# Patient Record
Sex: FEMALE | Race: WHITE | NOT HISPANIC OR LATINO | URBAN - METROPOLITAN AREA
[De-identification: names, ages, dates, MRNs, and addresses within clinical notes are randomized per-mention and may not be internally consistent; named-entity substitution may affect disease eponyms.]

---

## 2017-11-27 ENCOUNTER — OFFICE VISIT - HEALTHEAST (OUTPATIENT)
Dept: INTERNAL MEDICINE | Facility: CLINIC | Age: 32
End: 2017-11-27

## 2017-11-27 DIAGNOSIS — Z00.00 ANNUAL PHYSICAL EXAM: ICD-10-CM

## 2017-11-27 LAB
CHOLEST SERPL-MCNC: 119 MG/DL
FASTING STATUS PATIENT QL REPORTED: YES
HDLC SERPL-MCNC: 44 MG/DL
LDLC SERPL CALC-MCNC: 66 MG/DL
TRIGL SERPL-MCNC: 46 MG/DL

## 2017-11-27 ASSESSMENT — MIFFLIN-ST. JEOR: SCORE: 1836.69

## 2017-12-07 ENCOUNTER — OFFICE VISIT - HEALTHEAST (OUTPATIENT)
Dept: INTERNAL MEDICINE | Facility: CLINIC | Age: 32
End: 2017-12-07

## 2017-12-07 DIAGNOSIS — Z12.4 ENCOUNTER FOR PAPANICOLAOU SMEAR FOR CERVICAL CANCER SCREENING: ICD-10-CM

## 2017-12-07 ASSESSMENT — MIFFLIN-ST. JEOR: SCORE: 1864.81

## 2017-12-12 LAB
HPV INTERPRETATION - HISTORICAL: NORMAL
HPV INTERPRETER - HISTORICAL: NORMAL

## 2017-12-13 LAB
BKR LAB AP ABNORMAL BLEEDING: NO
BKR LAB AP BIRTH CONTROL/HORMONES: NORMAL
BKR LAB AP CERVICAL APPEARANCE: NORMAL
BKR LAB AP GYN ADEQUACY: NORMAL
BKR LAB AP GYN INTERPRETATION: NORMAL
BKR LAB AP HPV REFLEX: NORMAL
BKR LAB AP LMP: NORMAL
BKR LAB AP PATIENT STATUS: NORMAL
BKR LAB AP PREVIOUS ABNORMAL: NO
BKR LAB AP PREVIOUS NORMAL: NORMAL
HIGH RISK?: NO
PATH REPORT.COMMENTS IMP SPEC: NORMAL
RESULT FLAG (HE HISTORICAL CONVERSION): NORMAL

## 2018-05-10 ENCOUNTER — OFFICE VISIT - HEALTHEAST (OUTPATIENT)
Dept: INTERNAL MEDICINE | Facility: CLINIC | Age: 33
End: 2018-05-10

## 2018-05-10 ENCOUNTER — COMMUNICATION - HEALTHEAST (OUTPATIENT)
Dept: TELEHEALTH | Facility: CLINIC | Age: 33
End: 2018-05-10

## 2018-05-10 DIAGNOSIS — R10.2 SUPRAPUBIC DISCOMFORT: ICD-10-CM

## 2018-05-10 DIAGNOSIS — R11.0 NAUSEA: ICD-10-CM

## 2018-05-10 LAB
ALBUMIN UR-MCNC: ABNORMAL MG/DL
APPEARANCE UR: ABNORMAL
BACTERIA #/AREA URNS HPF: ABNORMAL HPF
BILIRUB UR QL STRIP: ABNORMAL
COLOR UR AUTO: YELLOW
GLUCOSE UR STRIP-MCNC: NEGATIVE MG/DL
HCG UR QL: NEGATIVE
HGB UR QL STRIP: ABNORMAL
KETONES UR STRIP-MCNC: ABNORMAL MG/DL
LEUKOCYTE ESTERASE UR QL STRIP: ABNORMAL
MUCOUS THREADS #/AREA URNS LPF: ABNORMAL LPF
NITRATE UR QL: NEGATIVE
PH UR STRIP: 5.5 [PH] (ref 5–8)
RBC #/AREA URNS AUTO: ABNORMAL HPF
SP GR UR STRIP: 1.02 (ref 1–1.03)
SP GR UR STRIP: 1.02 (ref 1–1.03)
SQUAMOUS #/AREA URNS AUTO: ABNORMAL LPF
TRANS CELLS #/AREA URNS HPF: ABNORMAL LPF
UROBILINOGEN UR STRIP-ACNC: ABNORMAL
WBC #/AREA URNS AUTO: ABNORMAL HPF

## 2018-05-11 LAB — BACTERIA SPEC CULT: NO GROWTH

## 2018-11-29 ENCOUNTER — OFFICE VISIT - HEALTHEAST (OUTPATIENT)
Dept: FAMILY MEDICINE | Facility: CLINIC | Age: 33
End: 2018-11-29

## 2018-11-29 DIAGNOSIS — Z00.00 ROUTINE GENERAL MEDICAL EXAMINATION AT A HEALTH CARE FACILITY: ICD-10-CM

## 2018-11-29 DIAGNOSIS — Z12.31 SCREENING MAMMOGRAM, ENCOUNTER FOR: ICD-10-CM

## 2018-11-29 DIAGNOSIS — R21 RASH: ICD-10-CM

## 2018-11-29 DIAGNOSIS — H60.63 CHRONIC OTITIS EXTERNA OF BOTH EARS, UNSPECIFIED TYPE: ICD-10-CM

## 2018-11-29 DIAGNOSIS — N60.11 FIBROCYSTIC BREAST CHANGES, BILATERAL: ICD-10-CM

## 2018-11-29 DIAGNOSIS — N60.12 FIBROCYSTIC BREAST CHANGES, BILATERAL: ICD-10-CM

## 2018-11-29 LAB
CHOLEST SERPL-MCNC: 106 MG/DL
FASTING STATUS PATIENT QL REPORTED: YES
FASTING STATUS PATIENT QL REPORTED: YES
GLUCOSE BLD-MCNC: 99 MG/DL (ref 70–99)
HBA1C MFR BLD: 5 % (ref 3.5–6)
HDLC SERPL-MCNC: 60 MG/DL
LDLC SERPL CALC-MCNC: 42 MG/DL
TRIGL SERPL-MCNC: 22 MG/DL

## 2018-11-29 ASSESSMENT — MIFFLIN-ST. JEOR: SCORE: 1802.44

## 2019-01-02 ENCOUNTER — HOSPITAL ENCOUNTER (OUTPATIENT)
Dept: MAMMOGRAPHY | Facility: CLINIC | Age: 34
Discharge: HOME | End: 2019-01-02
Attending: FAMILY MEDICINE

## 2019-01-02 DIAGNOSIS — Z12.31 SCREENING MAMMOGRAM, ENCOUNTER FOR: ICD-10-CM

## 2019-01-02 DIAGNOSIS — N60.12 FIBROCYSTIC BREAST CHANGES, BILATERAL: ICD-10-CM

## 2019-01-02 DIAGNOSIS — N60.11 FIBROCYSTIC BREAST CHANGES, BILATERAL: ICD-10-CM

## 2019-08-02 ENCOUNTER — OFFICE VISIT - HEALTHEAST (OUTPATIENT)
Dept: FAMILY MEDICINE | Facility: CLINIC | Age: 34
End: 2019-08-02

## 2019-08-02 DIAGNOSIS — F32.1 MODERATE MAJOR DEPRESSION (H): ICD-10-CM

## 2019-08-02 DIAGNOSIS — Z00.00 ROUTINE GENERAL MEDICAL EXAMINATION AT A HEALTH CARE FACILITY: ICD-10-CM

## 2019-08-02 LAB
CHOLEST SERPL-MCNC: 111 MG/DL
FASTING STATUS PATIENT QL REPORTED: ABNORMAL
FASTING STATUS PATIENT QL REPORTED: NORMAL
GLUCOSE BLD-MCNC: 90 MG/DL (ref 70–125)
HDLC SERPL-MCNC: 48 MG/DL
LDLC SERPL CALC-MCNC: 57 MG/DL
TRIGL SERPL-MCNC: 32 MG/DL

## 2019-08-02 ASSESSMENT — MIFFLIN-ST. JEOR: SCORE: 1864.02

## 2019-08-02 NOTE — ASSESSMENT & PLAN NOTE
Discussed treatment options. She would like to try medications. Start sertraline 25 mg daily for 7 days, then 50 mg daily. Lorazepam as needed at bedtime for short term use only.  was checked and was clear. Recheck in 3 months. Psychology recommended and patient is agreeable. Referral placed.

## 2019-08-04 LAB — HBA1C MFR BLD: 5.4 % (ref 4.2–6.1)

## 2019-09-10 ENCOUNTER — OFFICE VISIT - HEALTHEAST (OUTPATIENT)
Dept: BEHAVIORAL HEALTH | Facility: HOSPITAL | Age: 34
End: 2019-09-10

## 2019-09-10 DIAGNOSIS — F33.0 MAJOR DEPRESSIVE DISORDER, RECURRENT EPISODE, MILD (H): ICD-10-CM

## 2019-09-13 ASSESSMENT — ANXIETY QUESTIONNAIRES
7. FEELING AFRAID AS IF SOMETHING AWFUL MIGHT HAPPEN: SEVERAL DAYS
4. TROUBLE RELAXING: SEVERAL DAYS
3. WORRYING TOO MUCH ABOUT DIFFERENT THINGS: SEVERAL DAYS
IF YOU CHECKED OFF ANY PROBLEMS ON THIS QUESTIONNAIRE, HOW DIFFICULT HAVE THESE PROBLEMS MADE IT FOR YOU TO DO YOUR WORK, TAKE CARE OF THINGS AT HOME, OR GET ALONG WITH OTHER PEOPLE: SOMEWHAT DIFFICULT
2. NOT BEING ABLE TO STOP OR CONTROL WORRYING: SEVERAL DAYS
5. BEING SO RESTLESS THAT IT IS HARD TO SIT STILL: SEVERAL DAYS
6. BECOMING EASILY ANNOYED OR IRRITABLE: MORE THAN HALF THE DAYS
GAD7 TOTAL SCORE: 9
1. FEELING NERVOUS, ANXIOUS, OR ON EDGE: MORE THAN HALF THE DAYS

## 2019-09-13 ASSESSMENT — PATIENT HEALTH QUESTIONNAIRE - PHQ9: SUM OF ALL RESPONSES TO PHQ QUESTIONS 1-9: 6

## 2019-10-01 ENCOUNTER — OFFICE VISIT - HEALTHEAST (OUTPATIENT)
Dept: BEHAVIORAL HEALTH | Facility: HOSPITAL | Age: 34
End: 2019-10-01

## 2019-10-01 DIAGNOSIS — F33.0 MAJOR DEPRESSIVE DISORDER, RECURRENT EPISODE, MILD (H): ICD-10-CM

## 2019-10-15 ENCOUNTER — OFFICE VISIT - HEALTHEAST (OUTPATIENT)
Dept: BEHAVIORAL HEALTH | Facility: HOSPITAL | Age: 34
End: 2019-10-15

## 2019-10-15 DIAGNOSIS — F33.0 MAJOR DEPRESSIVE DISORDER, RECURRENT EPISODE, MILD (H): ICD-10-CM

## 2019-10-16 ENCOUNTER — AMBULATORY - HEALTHEAST (OUTPATIENT)
Dept: BEHAVIORAL HEALTH | Facility: CLINIC | Age: 34
End: 2019-10-16

## 2019-10-29 ENCOUNTER — OFFICE VISIT - HEALTHEAST (OUTPATIENT)
Dept: BEHAVIORAL HEALTH | Facility: HOSPITAL | Age: 34
End: 2019-10-29

## 2019-10-29 DIAGNOSIS — F33.0 MAJOR DEPRESSIVE DISORDER, RECURRENT EPISODE, MILD (H): ICD-10-CM

## 2019-11-12 ENCOUNTER — OFFICE VISIT - HEALTHEAST (OUTPATIENT)
Dept: BEHAVIORAL HEALTH | Facility: HOSPITAL | Age: 34
End: 2019-11-12

## 2019-11-12 ENCOUNTER — AMBULATORY - HEALTHEAST (OUTPATIENT)
Dept: BEHAVIORAL HEALTH | Facility: CLINIC | Age: 34
End: 2019-11-12

## 2019-11-12 DIAGNOSIS — F33.0 MAJOR DEPRESSIVE DISORDER, RECURRENT EPISODE, MILD (H): ICD-10-CM

## 2019-12-11 ENCOUNTER — OFFICE VISIT - HEALTHEAST (OUTPATIENT)
Dept: BEHAVIORAL HEALTH | Facility: HOSPITAL | Age: 34
End: 2019-12-11

## 2019-12-11 DIAGNOSIS — F33.0 MILD EPISODE OF RECURRENT MAJOR DEPRESSIVE DISORDER (H): ICD-10-CM

## 2019-12-18 ENCOUNTER — OFFICE VISIT - HEALTHEAST (OUTPATIENT)
Dept: BEHAVIORAL HEALTH | Facility: HOSPITAL | Age: 34
End: 2019-12-18

## 2019-12-18 DIAGNOSIS — F33.0 MILD EPISODE OF RECURRENT MAJOR DEPRESSIVE DISORDER (H): ICD-10-CM

## 2020-01-08 ENCOUNTER — OFFICE VISIT - HEALTHEAST (OUTPATIENT)
Dept: BEHAVIORAL HEALTH | Facility: HOSPITAL | Age: 35
End: 2020-01-08

## 2020-01-08 DIAGNOSIS — F33.0 MAJOR DEPRESSIVE DISORDER, RECURRENT EPISODE, MILD (H): ICD-10-CM

## 2020-01-22 ENCOUNTER — OFFICE VISIT - HEALTHEAST (OUTPATIENT)
Dept: BEHAVIORAL HEALTH | Facility: HOSPITAL | Age: 35
End: 2020-01-22

## 2020-01-22 DIAGNOSIS — F33.0 MAJOR DEPRESSIVE DISORDER, RECURRENT EPISODE, MILD (H): ICD-10-CM

## 2020-02-05 ENCOUNTER — OFFICE VISIT - HEALTHEAST (OUTPATIENT)
Dept: BEHAVIORAL HEALTH | Facility: HOSPITAL | Age: 35
End: 2020-02-05

## 2020-02-05 DIAGNOSIS — F33.0 MAJOR DEPRESSIVE DISORDER, RECURRENT EPISODE, MILD (H): ICD-10-CM

## 2020-02-19 ENCOUNTER — OFFICE VISIT - HEALTHEAST (OUTPATIENT)
Dept: BEHAVIORAL HEALTH | Facility: HOSPITAL | Age: 35
End: 2020-02-19

## 2020-02-19 DIAGNOSIS — F33.0 MAJOR DEPRESSIVE DISORDER, RECURRENT EPISODE, MILD (H): ICD-10-CM

## 2020-03-04 ENCOUNTER — OFFICE VISIT - HEALTHEAST (OUTPATIENT)
Dept: BEHAVIORAL HEALTH | Facility: HOSPITAL | Age: 35
End: 2020-03-04

## 2020-03-04 DIAGNOSIS — F33.0 MAJOR DEPRESSIVE DISORDER, RECURRENT EPISODE, MILD (H): ICD-10-CM

## 2020-03-17 ENCOUNTER — COMMUNICATION - HEALTHEAST (OUTPATIENT)
Dept: BEHAVIORAL HEALTH | Facility: CLINIC | Age: 35
End: 2020-03-17

## 2020-07-08 ENCOUNTER — AMBULATORY - HEALTHEAST (OUTPATIENT)
Dept: BEHAVIORAL HEALTH | Facility: CLINIC | Age: 35
End: 2020-07-08

## 2020-07-24 ENCOUNTER — COMMUNICATION - HEALTHEAST (OUTPATIENT)
Dept: FAMILY MEDICINE | Facility: CLINIC | Age: 35
End: 2020-07-24

## 2020-07-24 DIAGNOSIS — F32.1 MODERATE MAJOR DEPRESSION (H): ICD-10-CM

## 2020-09-01 ENCOUNTER — COMMUNICATION - HEALTHEAST (OUTPATIENT)
Dept: FAMILY MEDICINE | Facility: CLINIC | Age: 35
End: 2020-09-01

## 2020-09-01 DIAGNOSIS — F32.1 MODERATE MAJOR DEPRESSION (H): ICD-10-CM

## 2020-09-03 ENCOUNTER — COMMUNICATION - HEALTHEAST (OUTPATIENT)
Dept: FAMILY MEDICINE | Facility: CLINIC | Age: 35
End: 2020-09-03

## 2020-09-04 ENCOUNTER — OFFICE VISIT - HEALTHEAST (OUTPATIENT)
Dept: FAMILY MEDICINE | Facility: CLINIC | Age: 35
End: 2020-09-04

## 2020-09-04 DIAGNOSIS — F32.1 MODERATE MAJOR DEPRESSION (H): ICD-10-CM

## 2020-09-04 ASSESSMENT — PATIENT HEALTH QUESTIONNAIRE - PHQ9: SUM OF ALL RESPONSES TO PHQ QUESTIONS 1-9: 7

## 2020-10-07 ENCOUNTER — COMMUNICATION - HEALTHEAST (OUTPATIENT)
Dept: FAMILY MEDICINE | Facility: CLINIC | Age: 35
End: 2020-10-07

## 2020-10-07 DIAGNOSIS — F32.1 MODERATE MAJOR DEPRESSION (H): ICD-10-CM

## 2021-05-26 ASSESSMENT — PATIENT HEALTH QUESTIONNAIRE - PHQ9: SUM OF ALL RESPONSES TO PHQ QUESTIONS 1-9: 6

## 2021-05-27 ASSESSMENT — PATIENT HEALTH QUESTIONNAIRE - PHQ9: SUM OF ALL RESPONSES TO PHQ QUESTIONS 1-9: 7

## 2021-05-28 ASSESSMENT — ANXIETY QUESTIONNAIRES: GAD7 TOTAL SCORE: 9

## 2021-05-31 VITALS — HEIGHT: 70 IN | BODY MASS INDEX: 33.18 KG/M2 | WEIGHT: 231.8 LBS

## 2021-05-31 VITALS — WEIGHT: 238 LBS | BODY MASS INDEX: 34.07 KG/M2 | HEIGHT: 70 IN

## 2021-05-31 NOTE — PROGRESS NOTES
Assessment/Plan:      Problem List Items Addressed This Visit     Moderate major depression (H)     Discussed treatment options. She would like to try medications. Start sertraline 25 mg daily for 7 days, then 50 mg daily. Lorazepam as needed at bedtime for short term use only.  was checked and was clear. Recheck in 3 months. Psychology recommended and patient is agreeable. Referral placed.         Relevant Medications    LORazepam (ATIVAN) 0.5 MG tablet    sertraline (ZOLOFT) 50 MG tablet    Other Relevant Orders    Ambulatory referral to Psychology      Other Visit Diagnoses     Routine general medical examination at a health care facility    -  Primary    Relevant Orders    Lipid [place] FASTING (Completed)    Glycosylated Hemoglobin A1c (Completed)    Glucose (Completed)          Patient Instructions   1. Start sertraline 25 mg daily for 7 days, then 50 mg daily.  2. We will notify you of lab results.  3. Recheck in 3 months.  4. Lorazepam at bedtime as needed.        No follow-ups on file.    Subjective:   Hermelinda Morales is a 34 y.o. female who presents today for a physical. She needs fasting labs done for a wellness physical for work/insurance.    The patient is also having some issues with depression. She has a history of depression starting in college. She did see a counselor in college and that was very helpful. She is having decreased energy and motivation. No plans to hurt herself. She drinks alcohol about 1-2 per week. No substance use. No family history of mood disorder but her family may not share this issue.      Patient Active Problem List   Diagnosis     BMI 34.0-34.9,adult     Dense breast tissue on mammogram     Moderate major depression (H)      History reviewed. No pertinent past medical history.  History reviewed. No pertinent surgical history.    Review of Systems   Constitutional: Negative for activity change, appetite change, fever and unexpected weight change.   HENT: Negative for  "congestion, hearing loss and sore throat.    Eyes: Negative for discharge and visual disturbance.   Respiratory: Negative for cough, shortness of breath and wheezing.    Cardiovascular: Negative for chest pain, palpitations and leg swelling.   Gastrointestinal: Negative for abdominal pain, blood in stool, constipation, diarrhea and vomiting.   Endocrine: Negative for polydipsia and polyuria.   Genitourinary: Negative for decreased urine volume, difficulty urinating, dysuria, frequency, hematuria and urgency.   Musculoskeletal: Negative for arthralgias, gait problem and myalgias.   Skin: Negative for rash.   Allergic/Immunologic: Negative for immunocompromised state.   Neurological: Negative for weakness.   Hematological: Does not bruise/bleed easily.         Objective:     Vitals:    08/02/19 1559 08/02/19 1604   BP: 118/90 120/90   Pulse: 72    Weight: (!) 235 lb 3.2 oz (106.7 kg)    Height: 5' 10.75\" (1.797 m)    LMP: 07/02/2019       Physical Exam   Constitutional: She is oriented to person, place, and time. She appears well-nourished. No distress.   HENT:   Right Ear: Tympanic membrane and ear canal normal.   Left Ear: Tympanic membrane and ear canal normal.   Mouth/Throat: Oropharynx is clear and moist.   Eyes: Pupils are equal, round, and reactive to light. Conjunctivae and EOM are normal.   Neck: Normal range of motion. Neck supple. Carotid bruit is not present. No thyromegaly present.   Cardiovascular: Normal rate, regular rhythm and normal heart sounds.   No murmur heard.  Pulmonary/Chest: Effort normal and breath sounds normal. She has no wheezes. She has no rales. Right breast exhibits no inverted nipple, no mass and no skin change. Left breast exhibits no inverted nipple, no mass and no skin change.   Abdominal: Soft. Bowel sounds are normal. She exhibits no distension and no mass. There is no hepatosplenomegaly. There is no tenderness. There is no rebound and no guarding. Hernia confirmed negative in " the ventral area.   Musculoskeletal: She exhibits no edema or deformity.   Lymphadenopathy:     She has no cervical adenopathy.   Neurological: She is alert and oriented to person, place, and time. She has normal strength. She displays no tremor. No cranial nerve deficit. She exhibits normal muscle tone. Gait normal.   Reflex Scores:       Patellar reflexes are 2+ on the right side and 2+ on the left side.  Skin: No rash noted.   Psychiatric: Her speech is normal. Judgment and thought content normal. Her affect is not labile. Cognition and memory are normal.   Patient is tearful. Good eye contact. Answers questions appropriately. Affect is blunted.  She is attentive.

## 2021-05-31 NOTE — PATIENT INSTRUCTIONS - HE
1. Start sertraline 25 mg daily for 7 days, then 50 mg daily.  2. We will notify you of lab results.  3. Recheck in 3 months.  4. Lorazepam at bedtime as needed.

## 2021-06-01 VITALS — BODY MASS INDEX: 31.58 KG/M2 | WEIGHT: 220.1 LBS

## 2021-06-01 NOTE — PROGRESS NOTES
Initial Psychotherapy Diagnostic Assessment     [x] Standard  [] Updated    Date(s): 9/10/2019  Start Time: 354  Stop Time: 454    Patient Name:  Hermelinda Morales  Age: 34 y.o.   :  1985  MRN:  [id number]    Session Type: Patient is presenting for an Individual session.       Reason for Referral:  Mrs. Morales is a 34 y.o. year-old female who was referred on 2019 by Dr. Singh for an evaluation of cognitive, behavioral, and emotional functioning.  The patient was made aware of the role of psychology service in the patient's care, risks and benefits, and the limits of confidentiality.  The patient agreed to proceed.         Persons Present: Patient and therapist    Presenting Problem/History:  Patient reported coming to therapy due to an increase in her depression. Patient indicated she has struggled with depression throughout her whole life. Patient reported it started to get worse in college so she sought therapy which was beneficial and decreased her anxiety. Patient indicated then in February starting to notice her depression slowly get worse. Patient reported it went from affecting her monthly to weekly, to every couple of days to daily. Patient indicated there are days where she will cry and have no idea why she is crying. Patient reported knowing it is affecting her marriage due to not being able to explain her emotions. Patient indicated she hopes that people will cancel plans so that she has a reason to stay home. Patient reported she is able to complete her daily asks such as working. Patient indicated she also has a lot of concern about her weight and wanting to lose weight. Patient reported she knows she needs to start doing things but has no motivation.       Patient s expectation for treatment (patient stated initial goal; i.e.: I want to let go of my worries , Medication treatment if indicated):  Patient reported wanting to learn coping skills to help her better manage her depression  symptoms.          Functional Impairments: (subjective- 0 is no issues and 4 is extreme issues)  Personal: 3  Family: 2  Work: 2  Social:2     How does the presenting problem affect patients daily functioning:    Patient indicated she has noticed she is abhishek isolated and not wanting to do activities.     Issues/Stressors:   Patient reported her stressors includes her marriage, her weight and her job.     Physical Problems: Dry mouth and Decreased appitite    Social Problems: Communications problems and Loss of interest in activities      Behavioral Problems: Obsessive/Compulsive      Cognitive Problems: Indecisiveness and Worries      Emotional Problems: Anxious, Nervous, Depressed mood, Mood swings and Lack of self confidence     Onset/Frequency/Duration presenting problem symptoms:    Patient reported feeling she has had depression her whole life. Patient indicated recognizing it in high school and then her depression getting worse in college.     How does the patient perceive her problem in relation to how others see his/her problem?    Patient indicated her  and her are aware of her depression symptoms and that it is affecting them in a negative way.     Family/Social History:     Marriages/Significant other:      Patient reported she has been  for 7 1/2 years. Patient indicated the marriage is struggling right now due to her depression and her  traveling a lot for work. Patient reported they are seeking marriage counseling.     Children:  (sex and ages, any significant issues):  Patient does not have any children.     Parents:  (ages, living or , how many years ):  Patient indicated her parents have been  over 30 years.     Siblings:  (birth order, ages, significant issues):  Patient reported she has an older brother.     Education:   Patient reported receiving her bachelor's degree.     Developmental factors:  (developmental milestones, head injuries, CVA s, etc. that  may have impeded milestones):  Patient indicated having a speech impairment and doing speech therapy. Patient reported otherwise she met all developmental milestones and no head trauma's.     Significant personal relationships including patient s evaluation of the relationship quality:  (Co-worker s, neighbor s, AA groups, Restorationist peers, etc.):   Patient indicated her support network includes her , a good friend and her aunt.       Sexual/physical/emotional/financial abuse/traumatic event:  (any child protection involvement; who reported, Impact on patient/family/other):   Patient denied any physical, mental or emotional abuse.       Contextual Non-personal factors contributing to the patients concerns:  (divorce in family, nation/natural disasters):  Patient did not identify any contextual non-personal factors contributing to her presenting concerns.     Strengths/personal resources:  (what does the patient do well, what is going well in life, positive personality characteristics):  Patient indicated her strengths includes being creative and loyal.       Belief system:    Patient did not identify a belief system.     Cultural influences and impact on patient:  (ask about all aspects of culture and ask which are relevant to the patient. Go beyond nationality and ethnicity. Consider biases, life style, community style, i.e.: urban, poverty, abuse, etc). see page 5 Diagnostic Assessment, Clinical Training for descriptors):  Patient is a 34 year old   female. Patient indicated growing up going to a very small school. Patient reported in 2nd grade getting casted as the outcast of the school. Patient indicated remembering this being a very hard and lonely time in her life. Patient reported trying to talk to her parents but they did not understand. Patient indicated in college trying to talk to her mom about depression and her mom saying it does not exist. Patient reported she has never talked to her  family about depression again. Patient indicated her parents raised her to be hard working and not talk about problems.     Cultural impact on health and health care:    Patient reported using Western medicine by going to the doctor and taking medications as prescribed.     Current living situation:  (Household members, housing status, stability, multiple moves, potential eviction):  Patient indicated she lives in a house with her  that they own. Patient reported she feels safe.     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     Legal Problems:  (DUI S, divorce, law suits, etc.):    Patient denied any legal problems.     Patient Medical History  Ms. Grajeda did not identify any significant medical history.     Current Medications:  Current Outpatient Medications   Medication Sig     ibuprofen (ADVIL,MOTRIN) 200 MG tablet Take 200 mg by mouth every 6 (six) hours as needed for pain.     LORazepam (ATIVAN) 0.5 MG tablet Take 1 tablet (0.5 mg total) by mouth at bedtime as needed for anxiety.     neomycin-polymyxin-hydrocortisone (CORTISPORIN) otic solution Administer 3 drops into the left ear 3 (three) times a day as needed.     sertraline (ZOLOFT) 50 MG tablet Take 1 tablet (50 mg total) by mouth daily.       Past Mental Health History:    Previous mental health diagnosis:  Patient indicated being diagnosed with depression.     Hx of Mental Health Treatment or Services:  Patient reported in college seeing a counselor for her depression.     Hx of MH Tx/Hospitalizations:  (When/Where: must include a review of patient s record.  If not available, why, what if anything are you doing to obtain a record?):    Patient denied any mental health hospitalizations.     Hx of Psychiatric Medications:  Patient reported being put on Zoloft by Dr. Singh      Self Report Measures:    On the Patient Health Questionnaire-9, a self report measure of depressive symptomatology, she obtained a score of 6, placing her in the range of mild depression.      On the Generalized Anxiety Disorder-7, a self-report measure of anxiety, she obtained a score of 9,  placing her in the range of moderate anxiety.      Suicidal/Homicidal Risk Assessment:    Suicidal: None reported  Ideation:None reported  History of Past Attempt(s): description: None reported  Crisis Plan: Not needed at this time    Homicidal: None reported   Ideation:None reported  History of Aggression towards others: None reported  Crisis Plan: Not needed at this time    [place] Suicide Severity Risk Screen:  Negative    History of destruction to property:  Description: None reported  Crisis Plan: Not needed at this time      Family Mental Health/Medical History    Family Mental Health:    Patient denied any known mental health in her family.     Family history of Suicide:  Patient denied any history of family suicides.     Family history Chemical Dependency:    Patient reported her father use to struggle with alcohol use.     Family Medical history: Family medical history is significant for:   Family History   Problem Relation Age of Onset     No Medical Problems Mother      No Medical Problems Father      No Medical Problems Brother      Hypertension Paternal Grandfather      Diabetes Maternal Grandmother      Skin cancer Maternal Grandmother      ABDIAZIZ disease Other      Breast cancer Neg Hx      Colon cancer Neg Hx      Prostate cancer Neg Hx      Chemical Use/Abuse History    CAGE-AID (screening to determine a patients use/abuse/dependency):      0/4    1.  Have you ever felt you ought to cut down on your drinking or drug use?  2.  Have people annoyed you by criticizing your  drinking or drug use?  3.  Have you felt bad or guilty about your drinking or drug use?  4.  Have you ever had a drink or use drugs first thing in the morning to steady her nerves are to get rid of a hangover (eye-opener)?    Alcohol:   [] None Reported    [x] Yes   [] No     Frequency: Occasionally  Age of first use: 22    Date of last use: Two weeks ago          Street Drugs:   [x] None Reported    [] Yes   [] No      Prescription Drugs:   [x] None Reported    [] Yes   [] No    Tobacco:   [x] None Reported    [] Yes   [] No    Caffeine:   [] None Reported    [x] Yes   [] No    Currently in a treatment program:   [] Yes   [x] No    History of CD Treatment:      [x] None Reported               MENTAL STATUS EVALUATION  Grooming: Well groomed  Attire: Appropriate  Age: Appears Stated  Behavior Towards Examiner: Cooperative  Motor Activity: Within normal   Eye Contact: Appropriate  Mood: Depressed  Affect: Depressed  Speech/Language: Within normal  Attention: Within normal  Concentration: Within normal  Thought Process: Within normal  Thought Content: Hallucinations: None reported  Delusions: None reported  Orientation: X 3  Memory: No Evidence of Impairment  Judgement: No Evidence of Impairment  Estimated Intelligence: Average  Demonstrated Insight: Adequate  Fund of Knowledge: adequate    Clinical Impressions/Assessment/Recommendations: (Stands alone; is a synopsis of patients story, any impacting family or cultural issue on diagnosis and how patient meets criteria for diagnosis). Can state does not meet full criteria and therefore a provisional diagnosis is given.    The patient is a 34 y.o. year-old,  female.  Presenting Problem/History:  Patient reported coming to therapy due to an increase in her depression. Patient indicated she has struggled with depression throughout her whole life. Patient reported it started to get worse in college so she sough therapy which was beneficial and decreased her anxiety.  Patient indicated then in February starting to notice her depression slowly get worse. Patient reported it went from affecting her monthly to weekly, to every couple of days to daily. Patient indicated there are days where she will cry and have no idea why she is crying. Patient reported knowing it is affecting her marriage due to not being able to explain her emotions. Patient indicated she hopes that people will cancel plans so that she has a reason to stay home. Patient reported she is able to complete her daily asks such as working. Patient indicated she also has a lot of concern about her weight and wanting to lose weight. Patient reported she knows she needs to start doing things but has no motivation. Patient reported wanting to learn coping skills to help her better manage her depression symptoms. Patient indicated she has noticed she is abhishek isolated and not wanting to do activities. Patient reported her stressors includes her marriage, her weight and her job. Patient reported feeling she has had depression her whole life. Patient indicated recognizing it in high school and then her depression getting worse in college. Patient indicated her  and her are aware of her depression symptoms and that it is affecting them in a negative way.     Patient indicated growing up going to a very small school. Patient reported in 2nd grade getting casted as the outcast of the school. Patient indicated remembering this being a very hard and lonely time in her life. Patient reported trying to talk to her parents but they did not understand. Patient indicated in college trying to talk to her mom about depression and her mom saying it does not exist. Patient reported she has never talked to her family about depression again. Patient indicated her parents raised her to be hard working and not talk about problems.        Prioritization of needed mental Health ancillary or other services.   It appears patient would benefit  from one on one psychotherapy bi-weekly to work on addressing her depression, working on skills to reduce her symptoms and challenge negative thoughts.   Patient should continue to work with Dr. Singh on medication management.   How Diagnostic criteria is met: (Include symptoms, frequency, duration, functional impairments).  It appears patient meets DSM-5 criteria for major depressive disorder, recurrent mild as evidenced by depressed mood, decreased interests in pleasurable activities, isolation, social withdrawal, increased appetite, excessive sleep at times, feelings of worthlessness, low self-esteem, frequent crying spell and irritable.       Explanation for any provisional diagnosis. Hypothesis why alternative diagnosis was considered and ruled out.  Rule out of generalized anxiety disorder due to patient reported symptoms of anxiety at times but does not meet criteria at this time.     Recommendations (treatment, referrals, services needed).  One on one bi-weekly psychotherapy  Continue medication management with Dr. Singh    Diagnosis (non-Axial as defined in DSM-5)  Major depressive disorder, recurrent, mild    Provisional Diagnosis (list only- no explanation needed)  DAYANA      WHODAS 2.0 12-item version 12.50%   H1= 20  H2= 0  H3= 10    Scores presented in qualifiers to represent level of disability.    NO problem - (none, absent, negligible,  ) - 0-4 %   MILD problem - (slight, low, ) - 5-24 %   MODERATE problem - (medium, fair,...) - 25-49 %   SEVERE problem - (high, extreme,  ) - 50-95 %   COMPLETE problem - (total, ) -  %    Assessment of client resolving presenting mental health concerns:  Ability  [] low     [x] average     [] high  Motivation [] low     [x] average     [] high  Willingness [] low     [x] average     [] high    Sources/references used in completing this assessment:   -Face to face interview  -Patient Chart  -Adult Intake Questionnaire  -Measures completed: WHODAS, C-SSRS, CAGE,  PHQ-9, DAYANA-7,     Initial Therapy Plan (ex: develop therapeutic relationship with therapist, Refer to psychiatry/psych testing, etc.):    1. Patient and therapist will develop therapeutic relationship.  2. Patient to present for follow up appointment to initiate psychotherapy services.  3. Patient to continue to follow up with primary care physician as needed and take medications as prescribed.  4. Develop comprehensive treatment plan.      Is patient's family involved in the treatment?  [] No     [x] Yes    If yes, How?  Patient's  is support of her seeking therapy.     Therapist s Signature/Supervision/co-signature statement:   Vera Marti [facility] Center[facility]LE

## 2021-06-01 NOTE — PROGRESS NOTES
Mental Health Visit Note    10/1/2019    Start time: 402    Stop Time: 502   Session # 1    53+ due to patient's mental health symptoms increase due to stress with marriage.     Session Type: Patient is presenting for an Individual session.    Hermelinda Morales is a 34 y.o. female is being seen today for    Chief Complaint   Patient presents with      Follow Up     Marriage Problems     New symptoms or complaints: Patient reported her marriage not going well.     Present For Session: Patient and therapist    Functional Impairment:   Personal: 3  Family: 2  Work: 2  Social:2    Clinical assessment of mental status: Ms. Morales presented on time for her scheduled session today. She was open and cooperative, and dressed appropriately for this session and weather. Her memory was fair for short and long term.  Her speech and language was soft, concise and appropriate for session.  Concentration and focus is fair.  Psychosis is not noted or reported. She reports her mood is sad and numb. Affect is congruent with speech.  Fund of knowledge is adequate. Insight is adequate for therapy.     Suicidal/Homicidal Ideation present: None Reported This Session    Patient's impression of their current status: Patient reported feeling numb. Patient indicated this last week finding out more and more information about her  talking to other women. Patient reported some of it being from the past and some from the present. Patient indicated she has confronted her  but unsure what she wants at this time. Patient reported her  is sorry but not even knowing how she feels right now. Patient indicated at times wanting to be comforted by her  and at other times needing distance. Patient reported she knows she is not taking care of herself and has no appetite at this time.     Therapist impression of patients current state: Patient appears to have fair insight into her mental health. This therapist challenged  patient on ways she is struggling with her own self-care at this time. This therapist processed with patient the importance of self-care at this time. This therapist processed with patient the importance of talking about her emotions by journal or if comfortable with her support network. This therapist went over coping skills that can be beneficial to help reduce racing thoughts.     Type of psychotherapeutic technique provided: Insight oriented, Client centered and CBT    Progress toward short term goals:Progress as expected, patient returning to scheduled session and noticing mental health symptoms.     Review of long term goals: Not done at today's visit    Diagnosis:   1. Major depressive disorder, recurrent episode, mild (H)        Plan and Follow up: Patient will return in two weeks for scheduled session. Patient would benefit from working on self-care. Patient should use coping skills taught in session to help reduce racing thoughts.     Discharge Criteria/Planning: Patient will continue with follow-up until therapy can be discontinued without return of signs and symptoms.    Vera Marti 10/1/2019

## 2021-06-02 VITALS — WEIGHT: 219 LBS | BODY MASS INDEX: 29.66 KG/M2 | HEIGHT: 72 IN

## 2021-06-02 NOTE — PROGRESS NOTES
Mental Health Visit Note    10/29/2019   Start time: 402    Stop Time: 448   Session # 3    Session Type: Patient is presenting for an Individual session.    Hermelinda Morales is a 34 y.o. female is being seen today for    Chief Complaint   Patient presents with      Follow Up     Marriage Stress     New symptoms or complaints: None    Present For Session: Patient and therapist    Functional Impairment:   Personal: 3  Family: 2  Work: 2  Social:2    Clinical assessment of mental status: Ms. Morales presented on time for her scheduled session today. She was open and cooperative, and dressed appropriately for this session and weather. Her memory was fair for short and long term.  Her speech and language was soft, concise and appropriate for session.  Concentration and focus is fair.  Psychosis is not noted or reported. She reports her mood is stressed. Affect is congruent with speech.  Fund of knowledge is adequate. Insight is adequate for therapy. No changes for this session.    Suicidal/Homicidal Ideation present: None Reported This Session    Patient's impression of their current status: Patient reported feeling stressed. Patient indicated most her stress is still related to her marriage. Patient reported her  continues to want to move on but she feels stuck. Patient indicated she wants answers that her  does not have at this time or may never have in the future. Patient reported she did make herself stop checking his information to help her work on letting go. Patient indicated she wants to work on her goals but feeling unsure what they are at this time. Patient reported she realized she lost herself in the marriage. Patient indicated she is trying to find a balance between working on her marriage and her own self-care.     Therapist impression of patients current state: Patient appears to have fair insight into her mental health. This therapist challenged patient on ways she continues to  struggle with feeling stuck. This therapist processed with patient what brings her sai in life. This therapist processed with patient struggles related to having a hard time moving on due to still feeling hurt.     Type of psychotherapeutic technique provided: Insight oriented, Client centered and CBT    Progress toward short term goals:Progress as expected, patient returning to scheduled session and noticing mental health symptoms.     Review of long term goals: Treatment Plan Updated on 10/15/2019    Diagnosis:   1. Major depressive disorder, recurrent episode, mild (H)        Plan and Follow up: Patient will return in two weeks for scheduled session. Patient would benefit from working on self-care. Patient should use coping skills taught in session to help reduce racing thoughts. Patient should work on identifying her needs at this time.    Discharge Criteria/Planning: Patient will continue with follow-up until therapy can be discontinued without return of signs and symptoms.    Vera Marti 10/30/2019

## 2021-06-02 NOTE — PROGRESS NOTES
Mental Health Visit Note    10/15/2019   Start time: 401    Stop Time: 450   Session # 2    Session Type: Patient is presenting for an Individual session.    Hermelinda Morales is a 34 y.o. female is being seen today for    Chief Complaint   Patient presents with      Follow Up     Marriage Stress     New symptoms or complaints: None    Present For Session: Patient and therapist    Functional Impairment:   Personal: 3  Family: 2  Work: 2  Social:2    Clinical assessment of mental status: Ms. Morales presented on time for her scheduled session today. She was open and cooperative, and dressed appropriately for this session and weather. Her memory was fair for short and long term.  Her speech and language was soft, concise and appropriate for session.  Concentration and focus is fair.  Psychosis is not noted or reported. She reports her mood is stressed. Affect is congruent with speech.  Fund of knowledge is adequate. Insight is adequate for therapy. Reviewed and changes made as needed.     Suicidal/Homicidal Ideation present: None Reported This Session    Patient's impression of their current status: Patient indicated feeling stressed. Patient reported finding out more information related to her  contacting female's in the past. Patient indicated she was able to express herself to him and feels that he may be understanding. Patient reported right now being unsure of a lot of things which is challenging for her. Patient indicated she did start exercising as a way to work on her own self-improvement. Patient reported when she looks back over the summer she thought she was depressed but realized it was only related to her marriage. Patient indicated she was active with friends and family. Patient reported continuing to have her ups and downs with emotions and knowing the importance of caring for herself.    Therapist impression of patients current state: Patient appears to have fair insight into her mental  health. This therapist processed with patient the hurt she is feeling from her . This therapist challenged patient on ways she is struggling to get her needs met. This therapist processed with patient the importance of self-care and identifying what she wants at this time.     Type of psychotherapeutic technique provided: Insight oriented, Client centered and CBT    Progress toward short term goals:Progress as expected, patient returning to scheduled session and noticing mental health symptoms.     Review of long term goals: Treatment Plan Updated on 10/15/2019    Diagnosis:   1. Major depressive disorder, recurrent episode, mild (H)        Plan and Follow up: Patient will return in two weeks for scheduled session. Patient would benefit from working on self-care. Patient should use coping skills taught in session to help reduce racing thoughts. Patient should work on identifying her needs at this time.    Discharge Criteria/Planning: Patient will continue with follow-up until therapy can be discontinued without return of signs and symptoms.    Vera Maldonadoer 10/16/2019

## 2021-06-02 NOTE — PROGRESS NOTES
Outpatient Mental Health Treatment Plan    Name:  Hermelinda Morales  :  1985  MRN:  [id number]    Treatment Plan:  Initial Treatment Plan  Intake/initial treatment plan date:  10/15/2019  Benefit and risks and alternatives have been discussed: Yes  Is this treatment appropriate with minimal intrusion/restrictions: Yes  Estimated duration of treatment:  Ongoing therapy at this time  Anticipated frequency of services:  Every 2 weeks  Necessity for frequency: This frequency is needed to establish therapeutic goals and for continuity of care in order to monitor progress.  Necessity for treatment: To address cognitive, behavioral, and/or emotional barriers in order to work toward goals and to improve quality of life.    Session Type: Patient is presenting for an Individual session.    Plan:      ? Depression    Goal:  Decrease average depression level from 4 to 1.   Strategies:    ?[x] Decrease social isolation     [x] Increase involvement in meaningful activities     ?[x] Discuss sleep hygiene     ?[x] Explore thoughts and expectations about self and others     ?[x] Process grief (loss of significant person, independence, role, etc.)     ?[x] Assess for suicide risk     ?[x] Implement physical activity routine (with physician approval)     [x] Consider introduction of bibliotherapy and/or videos     [x] Continue compliance with medical treatment plan (or explore barriers)     ?  ?Degree to which this is a problem: 4  Degree to which goal is met: 1    Date of next review: 01/15/2020    Functional Impairment:  1=Not at all/Rarely  2=Some days  3=Most Days  4=Every Day    Personal : 4  Family : 4  Social : 2   Work/school : 2    Diagnosis:   Major depressive disorder, recurrent, mild    WHODAS 2.0 12-item version 12.50%   H1= 20  H2= 0  H3= 10  Clinical assessments and measures completed:. DAYANA-7, PHQ-9 and CAGE-AID Chama     Strengths:  Patient indicated her strengths includes being creative and loyal.    Limitations:  Patient reported her limitations includes her self-esteem.   Cultural Considerations: Patient is a 34 year ols   female.     Persons responsible for this plan: Patient, Provider and Other: Dr. Singh            Psychotherapist Signature           Patient Signature:              Guardian Signature             Provider: Performed and documented by Vera Marti [facility]   Date:  10/16/2019

## 2021-06-03 VITALS — BODY MASS INDEX: 32.93 KG/M2 | HEIGHT: 71 IN | WEIGHT: 235.2 LBS

## 2021-06-03 NOTE — PROGRESS NOTES
Mental Health Visit Note    11/12/2019   Start time: 356    Stop Time: 440   Session # 4    Session Type: Patient is presenting for an Individual session.    Hermelinda Morales is a 34 y.o. female is being seen today for    Chief Complaint   Patient presents with      Follow Up     Marriage Struggles     New symptoms or complaints: Patient indicated her  asking for mediation.     Present For Session: Patient and therapist    Functional Impairment:   Personal: 3  Family: 2  Work: 2  Social:2    Clinical assessment of mental status: Ms. Morales presented on time for her scheduled session today. She was open and cooperative, and dressed appropriately for this session and weather. Her memory was fair for short and long term.  Her speech and language was soft, concise and appropriate for session.  Concentration and focus is fair.  Psychosis is not noted or reported. She reports her mood is depressed and scared. Affect is congruent with speech.  Fund of knowledge is adequate. Insight is adequate for therapy. Reviewed and changes made as needed.     Suicidal/Homicidal Ideation present: None Reported This Session    Patient's impression of their current status: Patient indicated feeling depressed and scared. Patient reported after the trip her  indicating he did not feel spark in their marriage. Patient indicated then Sunday night him saying he wanted mediation and that he does not see patient changing. Patient reported reflecting a lot on how she has not made changes and ways she is willing to make the changes. Patient indicated at this point being scared of losing him. Patient reported she is unsure where he is at with thinking about mediation and if change is even possible. Patient indicated she has continued to express to him that she is seeing the changes she needs to make and will work on them. Patient reported feeling lost and very unsure.     Therapist impression of patients current state: Patient  appears to have fair insight into her mental health. This therapist processed with patient what changes she feels she needs to make in the relationship. This therapist challenged patient on what she needs from this relationship and her . This therapist processed with patient ways to work on trying to make changes for herself but that will also help the marriage. This therapist processed with patient her fear of the unknown. This therapist went over sleep hygiene and coping skills. This therapist processed with patient the importance of her support network at this time.     Type of psychotherapeutic technique provided: Insight oriented, Client centered and CBT    Progress toward short term goals:Progress as expected, patient returning to scheduled session and noticing mental health symptoms.     Review of long term goals: Treatment Plan Updated on 10/15/2019    Diagnosis:   1. Major depressive disorder, recurrent episode, mild (H)        Plan and Follow up: Patient will return in two weeks for scheduled session. Patient would benefit from working on self-care. Patient should use coping skills taught in session to help reduce racing thoughts. Patient should work on identifying her needs at this time.Patient would benefit from looking at ways she feels she wants to make changes. Patient should reach out to her support network.     Discharge Criteria/Planning: Patient will continue with follow-up until therapy can be discontinued without return of signs and symptoms.    Vera Backer 11/12/2019

## 2021-06-03 NOTE — PROGRESS NOTES
Patient will be meeting with Mulu Fernandez, Psychology intern bi-weekly starting in December until this therapist returns from leave.

## 2021-06-04 NOTE — PROGRESS NOTES
"Psychology Psychotherapy Note    Name:  Hermelinda Morales  :  1985  MRN:  [id number]    Date of Service:  2019  Duration:  45 minutes (3:00 - 3:45pm )    This is a dual signature report.  My supervising clinician is Dr. Eliana Hutchison.    Target Symptoms:    The patient was seen in light of concerns regarding symptoms of depression.    Participation:  The patient was able to participate and benefit from treatment as evidenced by her verbal expression of ideas and initiation of topics discussed.    Mental Status:    Mood:  anxious  Affect:  mood congruent  Suicidal Ideation:  absent  Homicidal Ideation:  absent  Thought process:  normal  Thought content:  Normal  Fund of Knowledge:  Sufficient  Attention/Concentration:  intact  Language ability:  intact  Speech: normal  Memory:  recent and remote memory intact  Insight and Judgement:  fair  Orientation:  person, place, time and situation  Appearance: well groomed, and casually-dressed,   Eye Contact:  Appropriate  Estimated IQ:  Average    Intervention:    Mrs. Morales presented to therapy on time and engaged appropriately. She reported her current level of depression as 6/10 and her anxiety as 7/10. The patient stated her  requested they see a  to begin divorce proceeding this past . She discussed her preference for a trial separation and made an appointment with a  to explore options. Patient disclosed brief history of the relationship and her recent focus on self-care to cope with relational difficulties. When asked what she would like to focus on during this brief therapy she stated, \"Stabilization and how to proceed with my marriage. I don't know how to act or what will make things better.\" Patient will return for follow up visit on 2019.     Psychoterapeutic Techniques:  Cognitive-behavioral therapy, motivational interviewing and supportive psychotherapy strategies were utilized.    Necessity:    The session was " necessary for the care of the patient to address symptoms of depression.    Progress:    She has shown improvement in her ability to utilize coping skills to address symptoms of depression evidenced by her self-reported decrease in depressive symptomology.    Plan:    This writer will continue to meet with the patient on an approximate weekly basis to address mental health symptoms by continuing to utilize cognitive-behavioral and supportive psychotherapy.    Diagnosis:    Major depressive episode, recurrent, mild.     Problem List:  Patient Active Problem List   Diagnosis     BMI 34.0-34.9,adult     Dense breast tissue on mammogram     Moderate major depression (H)       Performed and documented by: Mulu Fernandez MS, Doctoral Psychology Intern  Supervising Clinician: Dr. Eliana Hutchison  Date:  12/11/2019  Time:  3:53 PM    I have read, discussed and agree with the documentation provided by Mulu Fernandez MS, Psychology Intern.    Eliana Hutchison PsyD, ABPP, LP

## 2021-06-04 NOTE — PROGRESS NOTES
Psychology Psychotherapy  Note    Name:  Hermelinda Morales  :  1985  MRN:  [id number]    Date of Service:  2019  Duration:  45 minutes (2:00 - 2:45pm )    This is a dual signature report.  My supervising clinician is Dr. Sandy Atkinson    Target Symptoms:    The patient was seen in light of concerns regarding symptoms of depression.    Participation:  The patient was able to participate and benefit from treatment as evidenced by her verbal expression of ideas and initiation of topics discussed.    Mental Status:    Mood:  euthymic  Affect:  mood congruent  Suicidal Ideation:  absent  Homicidal Ideation:  absent  Thought process:  normal  Thought content:  Normal  Fund of Knowledge:  Sufficient  Attention/Concentration:  intact  Language ability:  intact  Speech: normal  Memory:  recent and remote memory intact  Insight and Judgement:  good  Orientation:  person, place, time and situation  Appearance: well groomed, and casually-dressed,   Eye Contact:  Appropriate  Estimated IQ:  Average    Intervention:    Ms. Morales presented to therapy on time and engaged appropriately. She explored her experience standing up for herself in her marriage this week and how that has positively impacted her mood. She discussed the importance she has placed on self-perseveration at this time and the need for setting boundaries with her  as he pursues a divorce. The patient expressed the importance of self-care (I.e. exercise) for her mood maintenance and discussed her fear that she has not addressed her emotions despite evidence to suggest she has. Patient agreed to participate in exercise 4 days per week to continue her self-care and provide structure during the holidays.     Psychoterapeutic Techniques:  Cognitive-behavioral therapy, motivational interviewing and supportive psychotherapy strategies were utilized.    Necessity:    The session was necessary for the care of the patient to address symptoms of  depression.    Progress:    She has shown improvement in her ability to utilize coping skills to address symptoms of depression.    Plan:    This writer will continue to meet with the patient on an approximate weekly basis to address mental health symptoms by continuing to utilize cognitive-behavioral and supportive psychotherapy.    Diagnosis:    Major depressive disorder, recurrent, mild    Problem List:  Patient Active Problem List   Diagnosis     BMI 34.0-34.9,adult     Dense breast tissue on mammogram     Moderate major depression (H)       Performed and documented by: Mulu Fernandez MS, Doctoral Psychology Intern  Supervising Clinician: Dr. Sandy Atkinson  Date:  12/18/2019  Time:  2:53 PM    I have read, discussed and agree with the documentation provided by Mulu Fernandez MS, Psychology Intern.

## 2021-06-05 NOTE — PROGRESS NOTES
"Psychology Psychotherapy  Note    Name:  Hermelinda Morales  :  1985  MRN:  [id number]    Date of Service:  2020  Duration:  45 minutes (2:00 - 2:45pm )    This is a dual signature report.  My supervising clinician is Dr. Eliana Hutchison.    Target Symptoms:    The patient was seen in light of concerns regarding symptoms of depression.    Participation:  The patient was able to participate and benefit from treatment as evidenced by her verbal expression of ideas and initiation of topics discussed.    Mental Status:    Mood:  euthymic  Affect:  tearful, mood congruent, appropriate of normal intensity  Suicidal Ideation:  absent  Homicidal Ideation:  absent  Thought process:  normal  Thought content:  Normal  Fund of Knowledge:  Sufficient  Attention/Concentration:  intact  Language ability:  intact  Speech: normal  Memory:  recent and remote memory intact  Insight and Judgement:  fair  Orientation:  person, place, time and situation  Appearance: well groomed, and casually-dressed,   Eye Contact:  Appropriate  Estimated IQ:  Average    Intervention:    Mrs. Morales presented on time and engaged appropriately. She discussed her goal of \"Regrowing a backbone\" and prioritizing her needs over the needs of others. The patient explored her anger and hurt related to her 's infidelity. Despite her anger, she noted that her mental health had improved since this summer and attributed this improvement/stability to her engagement in self-care, primarily exercise. The patient set a goal to engage in exercise 4 days per week and engage in meditation daily to maintain her mood and self-care during the following two weeks as she begins to meet for mediation with her .     Psychoterapeutic Techniques:  Cognitive-behavioral therapy, motivational interviewing and supportive psychotherapy strategies were utilized.    Necessity:    The session was necessary for the care of the patient to address symptoms of " depression.     Progress:    She has shown improvement in her ability to utilize coping skills to address symptoms of depression evidenced by her improvement in mood.    Plan:    This writer will continue to meet with the patient on an approximate weekly basis to address mental health symptoms by continuing to utilize cognitive-behavioral and supportive psychotherapy.    Diagnosis:    Major depressive disorder, recurrent, mild    Problem List:  Patient Active Problem List   Diagnosis     BMI 34.0-34.9,adult     Dense breast tissue on mammogram     Moderate major depression (H)       Performed and documented by: Mulu Fernandez MS, Doctoral Psychology Intern  Supervising Clinician: Dr. Eliana Hutchison  Date:  1/8/2020  Time:  1:34 PM    I have read, discussed and agree with the documentation provided by Mulu Fernandez MS, Psychology Intern.    Eliana Hutchison, Kathryn, ABPP, LP

## 2021-06-05 NOTE — PROGRESS NOTES
Psychology Psychotherapy  Note    Name:  Hermelinda Morales  :  1985  MRN:  [id number]    Date of Service:  2020  Duration:  45 minutes (3:00 - 3:45pm )    This is a dual signature report.  My supervising clinician is Dr. Eliana Hutchison.    Target Symptoms:    The patient was seen in light of concerns regarding symptoms of depression.    Participation:  The patient was able to participate and benefit from treatment as evidenced by her verbal expression of ideas and initiation of topics discussed.    Mental Status:    Mood:  unremarkable  Affect:  mood congruent  Suicidal Ideation:  absent  Homicidal Ideation:  absent  Thought process:  normal  Thought content:  Normal  Fund of Knowledge:  Sufficient  Attention/Concentration:  intact  Language ability:  intact  Speech: normal  Memory:  recent and remote memory intact  Insight and Judgement:  fair  Orientation:  person, place, time and situation  Appearance: well groomed, and casually-dressed,   Eye Contact:  Appropriate  Estimated IQ:  Average    Intervention:    Ms. Morales presented to therapy on time and engaged appropriately. She expressed happiness regarding reaching her goal weight of under 200 pounds. She described her tendency to compartmentalize her emotions regarding her divorce proceedings as she finds this most adaptive. The patient acknowledged that she will need to process these emotions regarding the end of her marriage, but that she is in more acceptance of the situation and has focused her attention on logistics of the divorce, such as housing and financial affairs for the mediation. Patient noted she has told her father about the divorce and has received a large amount of social support from friends and coworkers which she is grateful for. The patient set a goal to engage in exercise 3 to 4 days per week and engage in mediation 4 days per week to maintain her mood and participate in self-care.     Psychoterapeutic Techniques:   Cognitive-behavioral therapy, motivational interviewing and supportive psychotherapy strategies were utilized.    Necessity:    The session was necessary for the care of the patient to address symptoms of depression.    Progress:    She has shown improvement in her ability to utilize coping skills to address symptoms of depression evidenced by her increase in mood despite divorce proceedings.    Plan:    This writer will continue to meet with the patient on an approximate weekly basis to address mental health symptoms by continuing to utilize cognitive-behavioral and supportive psychotherapy.    Diagnosis:    Major Depressive Episode, Recurrent, Mild    Problem List:  Patient Active Problem List   Diagnosis     BMI 34.0-34.9,adult     Dense breast tissue on mammogram     Moderate major depression (H)       Performed and documented by: Mulu Fernandez MS, Doctoral Psychology Intern  Supervising Clinician: Dr. Eliana Hutchison  Date:  1/22/2020  Time:  3:53 PM

## 2021-06-05 NOTE — PROGRESS NOTES
Psychology Psychotherapy  Note    Name:  Hermelinda Morales  :  1985  MRN:  [id number]    Date of Service:  2020  Duration:  45 minutes (3:00 - 3:45pm )    This is a dual signature report.  My supervising clinician is Dr. Eliana Hutchison.    Target Symptoms:    The patient was seen in light of concerns regarding symptoms of depression.    Participation:  The patient was able to participate and benefit from treatment as evidenced by her verbal expression of ideas and initiation of topics discussed.    Mental Status:    Mood:  constricted  Affect:  mood congruent  Suicidal Ideation:  absent  Homicidal Ideation:  absent  Thought process:  normal  Thought content:  Normal  Fund of Knowledge:  Sufficient  Attention/Concentration:  intact  Language ability:  intact  Speech: normal  Memory:  recent and remote memory intact  Insight and Judgement:  fair  Orientation:  person, place, time and situation  Appearance: well groomed, and casually-dressed,   Eye Contact:  Appropriate  Estimated IQ:  Average    Intervention:    Ms. Morales presented to therapy on time and engaged appropriately. She discussed feeling drained and lacking energy, and attributed this to being in a stagnant place with her divorce mediations. The patient expressed frustration about wanting to move on in life but not being able to fully do this while in mediation negotiations. The patient endorsed loneliness recently but has attempted to keep herself busy by engaging in self-care and reaching out to her social support. She discussed her concerns and anticipation attending a wedding this weekend where her ex will be present, but noted she would focus her attention on her friend to cope.     Psychoterapeutic Techniques:  Cognitive-behavioral therapy, motivational interviewing and supportive psychotherapy strategies were utilized.    Necessity:    The session was necessary for the care of the patient to address symptoms of depression.    Progress:     She has shown improvement in her ability to utilize coping skills to address symptoms of depression evidenced by her increase in mood.    Plan:    This writer will continue to meet with the patient on an approximate weekly basis to address mental health symptoms by continuing to utilize cognitive-behavioral and supportive psychotherapy.    Diagnosis:    Major Depressive Disorder, Recurrent, Mild    Problem List:  Patient Active Problem List   Diagnosis     BMI 34.0-34.9,adult     Dense breast tissue on mammogram     Moderate major depression (H)       Performed and documented by: Mulu Fernandez MS, Doctoral Psychology Intern  Supervising Clinician: Dr. Eliana Hutchison  Date:  2/5/2020  Time:  3:53 PM

## 2021-06-10 NOTE — TELEPHONE ENCOUNTER
Refill Approved    Rx renewed per Medication Renewal Policy. Medication was last renewed on 8/2/19.    Bonita Shook, [facility] Connection Triage/Med Refill 7/27/2020     Requested Prescriptions   Pending Prescriptions Disp Refills     sertraline (ZOLOFT) 50 MG tablet [Pharmacy Med Name: SERTRALINE HCL 50 MG TABLET] 30 tablet 11     Sig: TAKE 1 TABLET BY MOUTH EVERY DAY       [facility] Refill Protocol  Passed - 7/24/2020 12:30 AM        Passed - PCP or prescribing provider visit in last year     Last office visit with prescriber/PCP: Visit date not found OR same dept: Visit date not found OR same specialty: Visit date not found  Last physical: 8/2/2019 Last MTM visit: Visit date not found   Next visit within 3 mo: Visit date not found  Next physical within 3 mo: Visit date not found  Prescriber OR PCP: Hermelinda Singh MD  Last diagnosis associated with med order: 1. Moderate major depression (H)  - sertraline (ZOLOFT) 50 MG tablet [Pharmacy Med Name: SERTRALINE HCL 50 MG TABLET]; TAKE 1 TABLET BY MOUTH EVERY DAY  Dispense: 30 tablet; Refill: 11    If protocol passes may refill for 12 months if within 3 months of last provider visit (or a total of 15 months).

## 2021-06-11 NOTE — PATIENT INSTRUCTIONS - HE
1. Continue current dosing of sertraline.  2. Follow up in one year, sooner if you have any concerns.  3. I would recommend a regular physical at your convenience.

## 2021-06-11 NOTE — PROGRESS NOTES
"Hermelinda Morales is a 35 y.o. female who is being evaluated via a billable telephone visit.      The patient has been notified of following:     \"This telephone visit will be conducted via a call between you and your physician/provider. We have found that certain health care needs can be provided without the need for a physical exam.  This service lets us provide the care you need with a short phone conversation.  If a prescription is necessary we can send it directly to your pharmacy.  If lab work is needed we can place an order for that and you can then stop by our lab to have the test done at a later time.    Telephone visits are billed at different rates depending on your insurance coverage. During this emergency period, for some insurers they may be billed the same as an in-person visit.  Please reach out to your insurance provider with any questions.    If during the course of the call the physician/provider feels a telephone visit is not appropriate, you will not be charged for this service.\"    Patient has given verbal consent to a Telephone visit? Yes    What phone number would you like to be contacted at? [phone]    Patient would like to receive their AVS by AVS Preference: Ralf.    Hermelinda Morales is a 35 y.o. female who is being evaluated via a billable telephone visit. Patient verbally consented to the telephone service today YES.  Patient location during visit: at home in [place].      HPI: The patient is seen today via virtual phone visit regarding followup on her depression. She reports that counseling was really helpful and she has completed that. She reports that she has had a lot increased social stressors that have affected her mood somewhat. Sertraline has been very helpful and at this point she would like to remain on her current dosing.     I have reviewed and updated the patient's Past Medical History, Social History, Family History and Medication List.    ALLERGIES  Patient has no " known allergies.      Assessment/Plan:  Problem List Items Addressed This Visit     Moderate major depression (H)    Relevant Medications    sertraline (ZOLOFT) 50 MG tablet           Patient Instructions   1. Continue current dosing of sertraline.  2. Follow up in one year, sooner if you have any concerns.  3. I would recommend a regular physical at your convenience.           Phone call duration:  6 minutes    Hermelinda Singh MD

## 2021-06-11 NOTE — TELEPHONE ENCOUNTER
RN cannot approve Refill Request    RN can NOT refill this medication Protocol failed and NO refill given. Last office visit: Visit date not found Last Physical: 8/2/2019 Last MTM visit: Visit date not found Last visit same specialty: Visit date not found.  Next visit within 3 mo: Visit date not found  Next physical within 3 mo: Visit date not found      Bonita Shook, [facility] Connection Triage/Med Refill 9/3/2020    Requested Prescriptions   Pending Prescriptions Disp Refills     sertraline (ZOLOFT) 50 MG tablet [Pharmacy Med Name: SERTRALINE HCL 50 MG TABLET] 30 tablet 0     Sig: TAKE 1 TABLET BY MOUTH EVERY DAY       [facility] Refill Protocol  Failed - 9/1/2020 12:39 AM        Failed - PCP or prescribing provider visit in last year     Last office visit with prescriber/PCP: Visit date not found OR same dept: Visit date not found OR same specialty: Visit date not found  Last physical: 8/2/2019 Last MTM visit: Visit date not found   Next visit within 3 mo: Visit date not found  Next physical within 3 mo: Visit date not found  Prescriber OR PCP: Hermelinda Singh MD  Last diagnosis associated with med order: 1. Moderate major depression (H)  - sertraline (ZOLOFT) 50 MG tablet [Pharmacy Med Name: SERTRALINE HCL 50 MG TABLET]; TAKE 1 TABLET BY MOUTH EVERY DAY  Dispense: 30 tablet; Refill: 0    If protocol passes may refill for 12 months if within 3 months of last provider visit (or a total of 15 months).

## 2021-06-14 NOTE — PROGRESS NOTES
"[facility] Clinic Note  Patient Name: Hermelinda Morales  Patient Age: 32 y.o.  YOB: 1985  MRN: [id number]  ?  Date of Visit: 12/7/2017  Reason for Office Visit:   Chief Complaint   Patient presents with     Gynecologic Exam     Is needing to have a routine pap.        HPI: Hermelinda Morales 32 y.o. who presents to clinic for PAP smear. She was seen for a physical the other week but was in the midst of her menstrual cycle and wished to delay it.     Her last pap was normal. No history of HPV. No complaints today       Review of Systems: As noted in HPI     Current Scheduled Meds:  Outpatient Encounter Prescriptions as of 12/7/2017   Medication Sig Dispense Refill     ibuprofen (ADVIL,MOTRIN) 200 MG tablet Take 200 mg by mouth every 6 (six) hours as needed for pain.       No facility-administered encounter medications on file as of 12/7/2017.        Objective / Physical Examination:  /80  Pulse 74  Ht 5' 10\" (1.778 m)  Wt (!) 238 lb (108 kg)  BMI 34.15 kg/m2  Wt Readings from Last 3 Encounters:   12/07/17 (!) 238 lb (108 kg)   11/27/17 (!) 231 lb 12.8 oz (105.1 kg)   12/22/16 (!) 234 lb (106.1 kg)     Body mass index is 34.15 kg/(m^2). (>25?)    General Appearance: Alert and oriented in no acute distress  : normal appearing cervix, no lesions, scant white discharge      Assessment / Plan / Medical Decision Making:      Encounter Diagnoses   Name Primary?     Encounter for Papanicolaou smear for cervical cancer screening Yes        1. Encounter for Papanicolaou smear for cervical cancer screening  - Gynecologic Cytology (PAP Smear)    Rangel Moran MD  [facility]   "

## 2021-06-14 NOTE — PROGRESS NOTES
Subjective:     Hermelinda Morales is a 32 y.o. female who presents for an annual exam. She needs her annual cholesterol and glucose checked for [employer] insurance.     She does have complaints of intermittent itchy ears L>R. No otalgia, fullness, hearing deficits.     She is due for pap today, however she is the middle of her menstrual cycle and would rather postpone this for now. She will reschedule.     No other concerns.     The patient reports that there is not domestic violence in her life.     Healthy Habits:   Regular Exercise: No  Healthy Diet: Yes  Dental Visits Regularly: Yes  Sexually active: Yes  Colonoscopy: N/A  Prevention of Osteoporosis: N/A  Last Dexa: N/A    Immunization History   Administered Date(s) Administered     Tdap 07/07/2014     Immunization status: up to date and documented.    Gynecologic History  No LMP recorded.  Contraception: none  Last Pap: 2014 Results were: normal, no co hpv testing was done at that time     OB History   No data available       Current Outpatient Prescriptions   Medication Sig Dispense Refill     ibuprofen (ADVIL,MOTRIN) 200 MG tablet Take 200 mg by mouth every 6 (six) hours as needed for pain.       No current facility-administered medications for this visit.      No past medical history on file.  No past surgical history on file.  Review of patient's allergies indicates no known allergies.  Family History   Problem Relation Age of Onset     Hypertension Paternal Grandfather      Diabetes Maternal Grandmother      Skin cancer Maternal Grandmother      Social History     Social History     Marital status:      Spouse name: Carlos Morales     Number of children: 0     Years of education: N/A     Occupational History           Retail     Social History Main Topics     Smoking status: Never Smoker     Smokeless tobacco: Never Used     Alcohol use 1.2 oz/week     2 Glasses of wine per week     Drug use: No     Sexual activity: Yes     Partners: Male  "    Other Topics Concern     Not on file     Social History Narrative       Review of Systems  General:  Negative except as noted above  Eyes: Negative except as noted above  Ears/Nose/Throat: Negative except as noted above  Cardiovascular: Negative except as noted above  Respiratory:  Negative except as noted above  Gastrointestinal:  Negative except as noted above  Musculoskeletal:  Negative except as noted above  Skin: Negative except as noted above  Neurologic: Negative except as noted above  Psychiatric: Negative except as noted above  Endocrine: Negative except as noted above  Heme/Lymphatic: Negative except as noted above   Allergic/Immunologic: Negative except as noted above      Objective:      /80  Pulse 73  Ht 5' 10\" (1.778 m)  Wt (!) 231 lb 12.8 oz (105.1 kg)  SpO2 100%  BMI 33.26 kg/m2    Physical Exam:  General Appearance: Alert, cooperative, no distress.  Head: Normocephalic, without obvious abnormality, atraumatic  Eyes: PERRL, conjunctiva/corneas clear, EOM's intact  Ears: Normal TM's and external ear canals, both ears. Some dry skin on outer ear and canal.   Nose: Nares normal, septum midline,mucosa normal, no drainage  Throat: Lips, mucosa, and tongue normal  Neck: Supple, symmetrical, trachea midline, no adenopathy;  thyroid: not enlarged, symmetric, no tenderness/mass/nodules  Back: Symmetric, no curvature, ROM normal, no CVA tenderness  Lungs: Clear to auscultation bilaterally, respirations unlabored  Breasts: declined  Heart: Regular rate and rhythm, S1 and S2 normal, no murmur, rub, or gallop  Abdomen: Soft, non-tender, bowel sounds active all four quadrants,  no masses, no organomegaly  Pelvic: declined  Extremities: Extremities normal, atraumatic, no cyanosis or edema  Skin: Skin color, texture, turgor normal, no rashes or lesions  Lymph nodes: Cervical, supraclavicular, and axillary nodes normal  Neurologic: Normal  Psych: Normal affect.  Does not appear anxious or depressed. "     Assessment:     1. Annual physical exam  Lipid Cascade    Glucose           Plan:      Labs, lipid and glucose - fill form and fax back to [facility] for pap smear   Can try a topical 1% hydrocortisone and moisturizer for outer ears   I recommended she eat a healthy diet and exercise on a regular basis.    Rangel Moran MD  [facility]

## 2021-06-16 PROBLEM — R92.30 DENSE BREAST TISSUE ON MAMMOGRAM: Status: ACTIVE | Noted: 2019-01-08

## 2021-06-16 PROBLEM — F32.1 MODERATE MAJOR DEPRESSION (H): Status: ACTIVE | Noted: 2019-08-05

## 2021-06-16 NOTE — TELEPHONE ENCOUNTER
Telephone Encounter by Mulu Fernandez at 3/17/2020 10:40 AM     Author: Mulu Fernandez Service: -- Author Type: Psychology Intern    Filed: 3/17/2020 10:51 AM Encounter Date: 3/17/2020 Status: Attested    : Mulu Fernandez (Psychology Intern) Cosigner: Eliana Hutchison Psy.D, LP at 3/19/2020 10:52 AM    Attestation signed by Eliana Hutchison Psy.D, LP at 3/19/2020 10:52 AM    Eliana Hutchison Psy.D., ABPP, ALEKSANDRA, LP                  Psychology Note     Name:  Hermelinda Morales  :  1985  MRN:  [id number]       Date of Service:  3/17/2020     Clinician contacted patient regarding telemedicine session. Patient denied phone sessions at this time.       Performed and documented by: Mulu Fernandez MS, Doctoral Psychology Intern  Supervising Clinician: Dr. Eliana Hutchison  Date:  3/17/2020  Time:  10:51 AM

## 2021-06-17 NOTE — PROGRESS NOTES
[facility] Clinic Note  Patient Name: Hermelinda Morales  Patient Age: 32 y.o.  YOB: 1985  MRN: [id number]  ?  Date of Visit: 5/10/2018  Reason for Office Visit:   Chief Complaint   Patient presents with     Nausea     started yesterday morning, period started yesteday. Has never has nausea during periods.       HPI: Hermelinda Morales 32 y.o. who presents to clinic for associated nausea with her menstrual cycle, that started yesterday, normal flow, has lower abdominal cramping. The nausea has never been this bad. No vomiting. No fevers/chills, some mild body aches, no diarrhea, vomiting. She endorses suprapubic discomfort, feels different than normal. No dysuria, urgency, frequency. No discharge. No cough, sore throat  Review of Systems: As noted in HPI     Current Scheduled Meds:  Outpatient Encounter Prescriptions as of 5/10/2018   Medication Sig Dispense Refill     ibuprofen (ADVIL,MOTRIN) 200 MG tablet Take 200 mg by mouth every 6 (six) hours as needed for pain.       prochlorperazine (COMPAZINE) 10 MG tablet Take 1 tablet (10 mg total) by mouth every 6 (six) hours as needed for nausea. 10 tablet 0     sulfamethoxazole-trimethoprim (BACTRIM DS) 800-160 mg per tablet Take 1 tablet by mouth 2 (two) times a day for 5 days. 10 tablet 0     No facility-administered encounter medications on file as of 5/10/2018.        Objective / Physical Examination:  /74 (Patient Site: Right Arm, Patient Position: Sitting, Cuff Size: Adult Regular)  Pulse 80  Temp 98.8  F (37.1  C) (Oral)   Wt 220 lb 1.6 oz (99.8 kg)  LMP 05/09/2018  BMI 31.58 kg/m2  Wt Readings from Last 3 Encounters:   05/10/18 220 lb 1.6 oz (99.8 kg)   12/07/17 (!) 238 lb (108 kg)   11/27/17 (!) 231 lb 12.8 oz (105.1 kg)     Body mass index is 31.58 kg/(m^2). (>25?)    General Appearance: Alert and oriented in no acute distress  Ears: Tympanic membrane clear with landmarks well visualized bilaterally  Eyes: Conjunctivae  clear  Nose: Septum midline, nares patent, mucosa moist and without drainage  Throat: Lips and mucosa moist. pharynx without erythema or exudate  Neck: Supple, trachea midline. No cervical adenopathy. No Thyromegaly   Back: no cva tenderness  Lungs:. Normal inspiratory and expiratory effort.  Cardiovascular: RRR   Abdomen: Bowel sounds active all four quadrants. Soft, suprapubic tenderness  Integumentary: Warm and dry  Neuro: Alert and oriented, follows commands appropriately    Assessment / Plan / Medical Decision Making:      Encounter Diagnoses   Name Primary?     Nausea Yes     Suprapubic discomfort         1. Nausea  2. Suprapubic discomfort    UPT negative  Her u/a had a number of positive markers which could indicate acute cystitis w  Will start on bactrim ds BID and advised to drink plenty of water, cranberry juice, etc  If not improving or symptoms worsen, return to clinic     - Pregnancy (Beta-hCG, Qual), Urine  - prochlorperazine (COMPAZINE) 10 MG tablet; Take 1 tablet (10 mg total) by mouth every 6 (six) hours as needed for nausea.  Dispense: 10 tablet; Refill: 0  - Culture, Urine  - sulfamethoxazole-trimethoprim (BACTRIM DS) 800-160 mg per tablet; Take 1 tablet by mouth 2 (two) times a day for 5 days.  Dispense: 10 tablet; Refill: 0    Follow up prn    Total time spent with patient was 15 minutes with >50% of time spent in face-to-face counseling regarding the above plan     Rangel Moran MD  [facility]

## 2021-06-20 NOTE — LETTER
Letter by Hermelinda Singh MD at      Author: Hermelinda Singh MD Service: -- Author Type: --    Filed:  Encounter Date: 9/3/2020 Status: (Other)         Hermelinda Morales   [address] [place] [address]      9/3/2020       Dear Hermelinda,       In reviewing your records, we have determined a gap in your preventive services. Based on your age and health history, we recommend the follow service.     ? General Physical  ? Med check      If you have had the service elsewhere, please contact us so we can update our records. Please let us know if you have transferred your care to another clinic.    Please call [phone] to schedule this appointment.    We believe that a strong preventive care program, including regular physicals and follow-up care is an important part of a healthy lifestyle and we are committed to helping you maintain your health.    Thank you for choosing us as your health care provider.    Sincerely,     [facility]

## 2021-06-20 NOTE — LETTER
Letter by Hermelinda Singh MD at      Author: Hermelinda Singh MD Service: -- Author Type: --    Filed:  Encounter Date: 9/4/2020 Status: (Other)                    My Depression Action Plan  Name: Hermelinda Morales   Date of Birth 1985  Date: 9/4/2020    My Doctor: Hermelinda Singh MD   My Clinic: [facility] MEDICINE/OB  [address]  [facility] [address]  [phone]          GREEN    ZONE   Good Control    What it looks like:     Things are going generally well. You have normal ups and downs. You may even feel depressed from time to time, but bad moods usually last less than a day.   What you need to do:  1. Continue to care for yourself (see self care plan)  2. Check your depression survival kit and update it as needed  3. Follow your physicians recommendations including any medication.  4. Do not stop taking medication unless you consult with your physician first.           YELLOW         ZONE Getting Worse    What it looks like:     Depression is starting to interfere with your life.     It may be hard to get out of bed; you may be starting to isolate yourself from others.    Symptoms of depression are starting to last most all day and this has happened for several days.     You may have suicidal thoughts but they are not constant.   What you need to do:     1. Call your care team. Your response to treatment will improve if you keep your care team informed of your progress. Yellow periods are signs an adjustment may need to be made.     2. Continue your self-care.  Just get dressed and ready for the day.  Don't give yourself time to talk yourself out of it.    3. Talk to someone in your support network.    4. Open up your depression Depression Self-Care Plan / Wellness kit.           RED    ZONE Medical Alert - Get Help    What it looks like:     Depression is seriously interfering with your life.     You may experience these or other symptoms: You cant get out of bed most days,  cant work or engage in other necessary activities, you have trouble taking care of basic hygiene, or basic responsibilities, thoughts of suicide or death that will not go away, self-injurious behavior.     What you need to do:  1. Call your care team and request a same-day appointment. If they are not available (weekends or after hours) call your local crisis line, emergency room or 911.            Self-Care Plan / Wellness Kit    Self-Care for Depression  Heres the deal. Your body and mind are really not as separate as most people think.  What you do and think affects how you feel and how you feel influences what you do and think. This means if you do things that people who feel good do, it will help you feel better.  Sometimes this is all it takes.  There is also a place for medication and therapy depending on how severe your depression is, so be sure to consult with your medical provider and/ or Behavioral Health Consultant if your symptoms are worsening or not improving.     In order to better manage my stress, I will:    Exercise  Get some form of exercise, every day. This will help reduce pain and release endorphins, the feel good chemicals in your brain. This is almost as good as taking antidepressants!  This is not the same as joining a gym and then never going! (they count on that by the way?) It can be as simple as just going for a walk or doing some gardening, anything that will get you moving.      Hygiene   Maintain good hygiene (get out of bed in the morning, make your bed, brush your teeth, take a shower, and get dressed like you were going to work, even if you are unemployed).  If your clothes don't fit try to get ones that do.    Diet  Strive to eat foods that are good for me, drink plenty of water, and avoid excessive sugar, caffeine, alcohol, and other mood-altering substances.  Some foods that are helpful in depression are: complex carbohydrates, B vitamins, flaxseed, fish or fish oil, fresh  fruits and vegetables.    Psychotherapy  Agree to participate in Individual Therapy (if recommended).    Medication  If prescribed medications, I agree to take them.  Missing doses can result in serious side effects.  I understand that drinking alcohol, or other illicit drug use, may cause potential side effects.  I will not stop my medication abruptly without first discussing it with my provider.    Staying Connected With Others  Stay in touch with my friends, family members, and my primary care provider/team.    Use your imagination  Be creative.  We all have a creative side; it doesnt matter if its oil painting, sand castles, or mud pies! This will also kick up the endorphins.    Witness Beauty  (AKA stop and smell the roses) Take a look outside, even in mid-winter. Notice colors, textures. Watch the squirrels and birds.     Service to others  Be of service to others.  There is always someone else in need.  By helping others we can get out of ourselves and remember the really important things.  This also provides opportunities for practicing all the other parts of the program.    Humor  Laugh and be silly!  Adjust your TV habits for less news and crime-drama and more comedy.    Control your stress  Try breathing deep, massage therapy, biofeedback, and meditation. Find time to relax each day.     Crisis Text Line  http://www.crisistextline.org    The Crisis Text Line serves anyone, in any type of crisis, providing access to free, 24/7 support and information via the medium people already use and trust:    Here's how it works:  1.  Text 583-907 from anywhere in the [place], anytime, about any type of crisis.  2.  A live, trained Crisis Counselor receives the text and responds quickly.  3.  The volunteer Crisis Counselor will help you move from a 'hot moment to a cool moment'.  My support system    Clinic Contact:  Phone number:    Contact 1:  Phone number:    Contact 2:  Phone number:    Yarsanism/:   Phone number:    Therapist:  Phone number:    [facility] crisis center:    Phone number:    Other community support:  Phone number:

## 2021-06-22 NOTE — PROGRESS NOTES
FEMALE PREVENTATIVE EXAM    Assessment and Plan:       Problem List Items Addressed This Visit     None      Visit Diagnoses     Routine general medical examination at a health care facility    -  Primary    Relevant Orders    Glycosylated Hemoglobin A1c (Completed)    Lipid Winn FASTING (Completed)    Glucose (Completed)    Chronic otitis externa of both ears, unspecified type        Relevant Medications    neomycin-polymyxin-hydrocortisone (CORTISPORIN) otic solution    Rash        Screening mammogram, encounter for        Relevant Orders    Mammo Screening Bilateral    Fibrocystic breast changes, bilateral        Relevant Orders    Mammo Screening Bilateral        Patient Instructions   Cortisporin otic drops 3 drops in each ear up to 3 times daily as needed for itching.  Trial of swimmer's eardrops after showering daily for prevention of itching.  Hydrocortisone cream as needed to rash on chest.  You will receive a call to schedule mammogram.  We will send your form when labs available.  We will also mail a copy to you.       Next follow up:  Return in about 1 year (around 11/29/2019) for Annual physical.    Immunization Review  Adult Imm Review: No immunizations due today  Pt does not use tobacco products   Subjective:   Chief Complaint: Hermelinda Morales is an 33 y.o. female here for a preventative health visit. She also has a biometric screen form she needs completed for work.     HPI:  The patient is also complaining of chronic itching in both her ears. No discharge or drainage. No issues with seborrheic dermatitis of scalp.    Healthy Habits  Are you taking a daily aspirin? No  Do you typically exercising at least 40 min, 3-4 times per week?  Yes  Do you usually eat at least 4 servings of fruit and vegetables a day, include whole grains and fiber and avoid regularly eating high fat foods? NO  Have you had an eye exam in the past two years? Yes  Do you see a dentist twice per year? Yes  Do you have any  "concerns regarding sleep? No    Safety Screen  If you own firearms, are they secured in a locked gun cabinet or with trigger locks? Yes  Do you feel you are safe where you are living?: Yes (11/29/2018  9:29 AM)  Do you feel you are safe in your relationship(s)?: Yes (11/29/2018  9:29 AM)      Review of Systems:  Please see above.  The rest of the review of systems are negative for all systems.     History     Reviewed By Date/Time Sections Reviewed    Hermelinda Singh MD 11/29/2018  9:38 AM Tobacco, Alcohol, Drug Use, Sexual Activity    Hermelinda Singh MD 11/29/2018  9:37 AM Family    Hermelinda Singh MD 11/29/2018  9:35 AM Medical, Surgical    Pily Roa LPN 11/29/2018  9:29 AM Tobacco    Pily Roa LPN 11/29/2018  9:28 AM Tobacco    Pily Roa LPN 11/29/2018  9:26 AM Tobacco, Family    Pily Roa LPN 11/29/2018  9:25 AM Surgical, Tobacco, Alcohol, Drug Use, Sexual Activity, Family    Pily Roa LPN 11/29/2018  9:24 AM Tobacco            Objective:   Vital Signs:   Visit Vitals  /72 (Patient Site: Left Arm, Patient Position: Sitting, Cuff Size: Adult Large)   Pulse 72   Temp 98.6  F (37  C) (Oral)   Resp 20   Ht 5' 11.5\" (1.816 m) Comment: with shoes   Wt 219 lb (99.3 kg)   LMP 11/03/2018   SpO2 98% Comment: room air   Breastfeeding? No   BMI 30.12 kg/m       Physical Exam   Constitutional: She is oriented to person, place, and time. She appears well-nourished. No distress.   HENT:   Right Ear: Tympanic membrane and ear canal normal.   Left Ear: Tympanic membrane and ear canal normal.   Mouth/Throat: Oropharynx is clear and moist.   Eyes: Conjunctivae and EOM are normal. Pupils are equal, round, and reactive to light.   Neck: Normal range of motion. Neck supple. Carotid bruit is not present. No thyromegaly present.   Cardiovascular: Normal rate, regular rhythm and normal heart sounds.   No murmur heard.  Pulmonary/Chest: Effort normal and breath " sounds normal. She has no wheezes. She has no rales. Right breast exhibits no inverted nipple, no mass and no skin change. Left breast exhibits no inverted nipple, no mass and no skin change.   Abdominal: Soft. Bowel sounds are normal. She exhibits no distension and no mass. There is no hepatosplenomegaly. There is no tenderness. There is no rebound and no guarding. Hernia confirmed negative in the ventral area.   Musculoskeletal: She exhibits no edema or deformity.   Lymphadenopathy:     She has no cervical adenopathy.   Neurological: She is alert and oriented to person, place, and time. She has normal strength. She displays no tremor. No cranial nerve deficit. She exhibits normal muscle tone. Gait normal.   Reflex Scores:       Patellar reflexes are 2+ on the right side and 2+ on the left side.  Skin: No rash noted.   Psychiatric: She has a normal mood and affect. Her behavior is normal. Judgment and thought content normal.

## 2021-06-27 ENCOUNTER — HEALTH MAINTENANCE LETTER (OUTPATIENT)
Age: 36
End: 2021-06-27

## 2021-06-28 NOTE — PROGRESS NOTES
Progress Notes by Mulu Fernandez at 3/4/2020  3:00 PM     Author: Mulu Fernandez Service: -- Author Type: Psychology Intern    Filed: 3/4/2020  4:13 PM Encounter Date: 3/4/2020 Status: Attested    : Mulu Fernandez (Psychology Intern) Cosigner: Eliana Hutchison Psy.D, LP at 3/10/2020 11:13 AM    Attestation signed by Eliana Hutchison Psy.D, LP at 3/10/2020 11:13 AM    Eliana Hutchison Psy.D., ABPP, ALEKSANDRA, LP                  Psychology Psychotherapy  Note    Name:  Hermelinda Morales  :  1985  MRN:  [id number]    Date of Service:  3/4/2020  Duration:  50 minutes (3:00 - 3:50pm )    This is a dual signature report.  My supervising clinician is Dr. Eliana Hutchison.    Target Symptoms:    The patient was seen in light of concerns regarding symptoms of depression as evidenced by patient and staff report.    Participation:  The patient was able to participate and benefit from treatment as evidenced by her verbal expression of ideas and initiation of topics discussed.    Mental Status:    Mood:  euthymic  Affect:  mood congruent  Suicidal Ideation:  absent  Homicidal Ideation:  absent  Thought process:  normal  Thought content:  Normal  Fund of Knowledge:  Sufficient  Attention/Concentration:  intact  Language ability:  intact  Speech: normal  Memory:  recent and remote memory intact  Insight and Judgement:  age appropriate and fair  Orientation:  person, place, time and situation  Appearance: casually-dressed,   Eye Contact:  Appropriate  Estimated IQ:  Average    Intervention:    Ms. Morales presented to therapy on time and engaged appropriately. She discussed improvement in her depressive symptomology and stated she would like to discontinue therapy after next session as her mood has significantly improved. The patient discussed distancing herself with her  and moving forward with divorce process. She denies SI/HI. She appeared hopeful and motivated to continue engaging in hobbies and exercise.      Psychoterapeutic Techniques:  Cognitive-behavioral therapy, motivational interviewing and supportive psychotherapy strategies were utilized.    Necessity:    The session was necessary for the care of the patient to address symptoms of depression.    Progress:    She has shown improvement in her ability to utilize coping skills to address symptoms of depression.    Plan:    This writer will continue to meet with the patient on an approximate weekly basis to address mental health symptoms by continuing to utilize cognitive-behavioral and supportive psychotherapy.    Diagnosis:    Major Depressive Disorder, Recurrent, Mild    Problem List:  Patient Active Problem List   Diagnosis   ? BMI 34.0-34.9,adult   ? Dense breast tissue on mammogram   ? Moderate major depression (H)       Performed and documented by: Mulu Fernandez MS, Doctoral Psychology Intern  Supervising Clinician: Dr. Eliana Hutchison  Date:  3/4/2020  Time:  4:08 PM

## 2021-06-29 NOTE — PROGRESS NOTES
"Progress Notes by Mulu Fernandez at 7/8/2020  2:51 PM     Author: Mulu Fernandez Service: -- Author Type: Psychology Intern    Filed: 7/8/2020  4:22 PM Encounter Date: 7/8/2020 Status: Attested    : Mulu Fernandez (Psychology Intern) Cosigner: Eliana Hutchison Psy.D, LP at 7/17/2020 10:48 AM    Attestation signed by Eliana Hutchison Psy.D, LP at 7/17/2020 10:48 AM    Eliana Hutchison Psy.D., ABPP, ALEKSANDRA, LP                  MENTAL HEALTH AND ADDICTION SERVICE DISCHARGE PROGRESS NOTE    PSYCHOTHERAPIST: Mulu Fernandez MS, Psychology Intern    ADMISSION DATE: 12/11/2019        DISCHARGE DATE: 03/04/2020    I. BRIEF SUMMARY OF INTAKE ASSESSMENT AND DIAGNOSIS:    Presenting Problem:  The patient was seen in light of concerns regarding symptoms of depression. The patient was transferred to writer as previous clinician went on maternity leave. When asked what she would like to focus on during this brief therapy she stated, \"Stabilization and how to proceed with my marriage. I don't know how to act or what will make things better.\"     Social History:  Per initial intake conducted by OSCAR Cuenca, \"Patient reported she has been  for 7 1/2 years. Patient indicated the marriage is struggling right now due to her depression and her  traveling a lot for work. Patient reported they are seeking marriage counseling...Patient does not have any children.\"    Cultural and Individual Difference:  Per initial intake conducted by OSCAR Cuenca, \"Patient did not identify any contextual non-personal factors contributing to her presenting concerns. \"    Strengths and Barriers to Care:  Patient reported her strengths include being creative and loyal. Her barriers to care include her history of recurrent depression.     Summary of Diagnosis:   Per initial intake conducted by OSCAR Cuenca, \"Patient reported coming to therapy due to an increase in her depression. Patient indicated she has struggled " "with depression throughout her whole life. Patient reported it started to get worse in college so she sough therapy which was beneficial and decreased her anxiety. Patient indicated then in February starting to notice her depression slowly get worse. Patient reported it went from affecting her monthly to weekly, to every couple of days to daily. Patient indicated there are days where she will cry and have no idea why she is crying. Patient reported knowing it is affecting her marriage due to not being able to explain her emotions. Patient indicated she hopes that people will cancel plans so that she has a reason to stay home. Patient reported she is able to complete her daily asks such as working. Patient indicated she also has a lot of concern about her weight and wanting to lose weight. Patient reported she knows she needs to start doing things but has no motivation. Patient reported wanting to learn coping skills to help her better manage her depression symptoms. Patient indicated she has noticed she is abhishek isolated and not wanting to do activities. Patient reported her stressors includes her marriage, her weight and her job. Patient reported feeling she has had depression her whole life. Patient indicated recognizing it in high school and then her depression getting worse in college. Patient indicated her  and her are aware of her depression symptoms and that it is affecting them in a negative way...It appears patient meets DSM-5 criteria for major depressive disorder, recurrent mild as evidenced by depressed mood, decreased interests in pleasurable activities, isolation, social withdrawal, increased appetite, excessive sleep at times, feelings of worthlessness, low self-esteem, frequent crying spell and irritable.\"      II.   SUMMARY OF TREATMENT, INCLUDING SIGNIFICANT FINDINGS, TESTS, CLINICAL COURSE, INCLUDING PROGRESS IN ADDRESSING EACH IDENTIFIED PROBLEM:    Individual Psychotherapy and Clinician " Facilitating:  Patient met with this writer for a total of 7 sessions processing her divorce and depression. On 3/4/2020 she discussed improvement in her depressive symptomology and stated she would like to discontinue therapy as her mood has significantly improved.      Assessments Administrated and Findings (at Intake and time of discharge)   Upon termination the patient denied S/HI and endorsed improvement in her depressive symptomology.     III. FINAL ASSESSMENT:    Prognosis:     IV.   MEDICATIONS (FOLLOW-UP MEDICATION APPOINTMENT):  ibuprofen (ADVIL,MOTRIN) 200 MG tablet  Take 200 mg by mouth every 6 (six) hours as needed for pain.    LORazepam (ATIVAN) 0.5 MG tablet  Take 1 tablet (0.5 mg total) by mouth at bedtime as needed for anxiety.    neomycin-polymyxin-hydrocortisone (CORTISPORIN) otic solution  Administer 3 drops into the left ear 3 (three) times a day as needed.    sertraline (ZOLOFT) 50 MG tablet  Take 1 tablet (50 mg total) by mouth daily.         V.    DISCHARGE DIAGNOSIS    Major Depressive Disorder, Recurrent, Mild     VI.   AFTERCARE PLAN:      Patient was informed to contact 911, go to the ER and/or call a SI/HI hotline if they did not believe they could keep themselves safe. No further contact is planned.     Follow-up /agency: [facility]  Telephone/Address:   [phone]  [address] [place] [address]        Performed and documented by: Mulu Fernandez MS, Doctoral Psychology Intern  Supervising Clinician: Dr. Eliana Hutchison  Date:  7/8/2020  Time:  9:54 AM

## 2021-09-21 DIAGNOSIS — F32.1 MODERATE MAJOR DEPRESSION (H): ICD-10-CM

## 2021-09-21 NOTE — TELEPHONE ENCOUNTER
"sertraline (ZOLOFT) 50 MG tablet 90 tablet 3 9/4/2020  No   Sig - Route: Take 1 tablet (50 mg total) by mouth daily. - Oral   Sent to pharmacy as: sertraline 50 mg tablet (ZOLOFT)   E-Prescribing Status: Receipt confirmed by pharmacy (9/4/2020 11:02 AM CDT)     Routing refill request to provider for review/approval because:  Patient needs to be seen because it has been more than 1 year since last office visit. No upcoming appts on file.  No current PHQ-9 score on file in the last year.  Open Review Flowsheet  (Some visit information is confidential and restricted.)   PHQ-9 and GAD7 Scores  9/4/2020    PHQ-9 Total Score 7    9/4/2020     Last Written Prescription Date:  09/04/2020  Last Fill Quantity: 90,  # refills: 3   Last office visit provider:   09/04/2020 with Dr Haney.    Requested Prescriptions   Pending Prescriptions Disp Refills     sertraline (ZOLOFT) 50 MG tablet [Pharmacy Med Name: SERTRALINE 50MG TABLETS] 90 tablet 3     Sig: TAKE 1 TABLET BY MOUTH EVERY DAY       SSRIs Protocol Failed - 9/21/2021  4:07 AM        Failed - PHQ-9 score less than 5 in past 6 months     Please review last PHQ-9 score.           Failed - Recent (6 mo) or future (30 days) visit within the authorizing provider's specialty     Patient had office visit in the last 6 months or has a visit in the next 30 days with authorizing provider or within the authorizing provider's specialty.  See \"Patient Info\" tab in inbasket, or \"Choose Columns\" in Meds & Orders section of the refill encounter.            Passed - Medication is active on med list        Passed - Patient is age 18 or older        Passed - No active pregnancy on record        Passed - No positive pregnancy test in last 12 months             Vinita Khan 09/21/21 1:56 PM  "

## 2021-09-21 NOTE — TELEPHONE ENCOUNTER
Patient due to be seen for follow up and physical. Please contact her to schedule. One month refill sent to pharmacy.

## 2021-09-22 NOTE — TELEPHONE ENCOUNTER
Reason contacted:  Appt  Information relayed:  As per PCP note below.  Additional questions:  No  Further follow-up needed:  No, pt recently changed jobs and will call or use [email or web address] to schedule her Annual Physical.  Okay to leave a detailed message:  No

## 2021-10-17 ENCOUNTER — HEALTH MAINTENANCE LETTER (OUTPATIENT)
Age: 36
End: 2021-10-17

## 2021-10-19 PROBLEM — F32.9 MAJOR DEPRESSION: Status: ACTIVE | Noted: 2019-08-05

## 2022-07-23 ENCOUNTER — HEALTH MAINTENANCE LETTER (OUTPATIENT)
Age: 37
End: 2022-07-23

## 2022-10-01 ENCOUNTER — HEALTH MAINTENANCE LETTER (OUTPATIENT)
Age: 37
End: 2022-10-01

## 2022-12-22 ENCOUNTER — OFFICE VISIT (OUTPATIENT)
Dept: FAMILY MEDICINE | Facility: CLINIC | Age: 37
End: 2022-12-22
Payer: COMMERCIAL

## 2022-12-22 VITALS
WEIGHT: 228 LBS | TEMPERATURE: 98.2 F | HEIGHT: 70 IN | SYSTOLIC BLOOD PRESSURE: 123 MMHG | DIASTOLIC BLOOD PRESSURE: 82 MMHG | OXYGEN SATURATION: 97 % | BODY MASS INDEX: 32.64 KG/M2 | RESPIRATION RATE: 16 BRPM | HEART RATE: 71 BPM

## 2022-12-22 DIAGNOSIS — Z13.0 SCREENING FOR DISORDER OF BLOOD AND BLOOD-FORMING ORGANS: ICD-10-CM

## 2022-12-22 DIAGNOSIS — J02.9 SORE THROAT: Primary | ICD-10-CM

## 2022-12-22 DIAGNOSIS — Z13.1 SCREENING FOR DIABETES MELLITUS (DM): ICD-10-CM

## 2022-12-22 DIAGNOSIS — Z13.29 SCREENING FOR THYROID DISORDER: ICD-10-CM

## 2022-12-22 DIAGNOSIS — Z13.6 CARDIOVASCULAR SCREENING; LDL GOAL LESS THAN 160: ICD-10-CM

## 2022-12-22 DIAGNOSIS — F32.1 MODERATE MAJOR DEPRESSION (H): ICD-10-CM

## 2022-12-22 LAB
DEPRECATED S PYO AG THROAT QL EIA: NEGATIVE
FLUAV AG SPEC QL IA: NEGATIVE
FLUBV AG SPEC QL IA: POSITIVE
GROUP A STREP BY PCR: NOT DETECTED
SARS-COV-2 RNA RESP QL NAA+PROBE: NEGATIVE

## 2022-12-22 PROCEDURE — 99214 OFFICE O/P EST MOD 30 MIN: CPT | Performed by: NURSE PRACTITIONER

## 2022-12-22 PROCEDURE — U0003 INFECTIOUS AGENT DETECTION BY NUCLEIC ACID (DNA OR RNA); SEVERE ACUTE RESPIRATORY SYNDROME CORONAVIRUS 2 (SARS-COV-2) (CORONAVIRUS DISEASE [COVID-19]), AMPLIFIED PROBE TECHNIQUE, MAKING USE OF HIGH THROUGHPUT TECHNOLOGIES AS DESCRIBED BY CMS-2020-01-R: HCPCS | Performed by: NURSE PRACTITIONER

## 2022-12-22 PROCEDURE — U0005 INFEC AGEN DETEC AMPLI PROBE: HCPCS | Performed by: NURSE PRACTITIONER

## 2022-12-22 PROCEDURE — 87651 STREP A DNA AMP PROBE: CPT | Performed by: NURSE PRACTITIONER

## 2022-12-22 PROCEDURE — 87804 INFLUENZA ASSAY W/OPTIC: CPT | Performed by: NURSE PRACTITIONER

## 2022-12-22 ASSESSMENT — PATIENT HEALTH QUESTIONNAIRE - PHQ9
SUM OF ALL RESPONSES TO PHQ QUESTIONS 1-9: 11
SUM OF ALL RESPONSES TO PHQ QUESTIONS 1-9: 11
10. IF YOU CHECKED OFF ANY PROBLEMS, HOW DIFFICULT HAVE THESE PROBLEMS MADE IT FOR YOU TO DO YOUR WORK, TAKE CARE OF THINGS AT HOME, OR GET ALONG WITH OTHER PEOPLE: SOMEWHAT DIFFICULT

## 2022-12-22 ASSESSMENT — ENCOUNTER SYMPTOMS: SORE THROAT: 1

## 2022-12-22 ASSESSMENT — PAIN SCALES - GENERAL: PAINLEVEL: SEVERE PAIN (7)

## 2022-12-22 NOTE — RESULT ENCOUNTER NOTE
Ting Baez,    Attached are your test results.  Your test cam positive for influenza B   Unfortunately too far out for Tamiflu but now at least you know what is going on    Please contact us if you have any questions.    Azucena Toribio, CNP

## 2022-12-22 NOTE — PATIENT INSTRUCTIONS
PLAN:   1.   Symptomatic therapy suggested: rest, increase fluids, apply heat to sinuses prn, OTC Chloraseptic spray, Cepacol lozenges, and call prn if symptoms persist or worsen.  Increase calcium to 1000mg and 1000iu Vit D    2.  Orders Placed This Encounter   Medications    sertraline (ZOLOFT) 50 MG tablet     Sig: Take 1 tablet (50 mg) by mouth daily     Dispense:  90 tablet     Refill:  3     Orders Placed This Encounter   Procedures    Symptomatic COVID-19 Virus (Coronavirus) by PCR Nose    Lipid panel reflex to direct LDL Fasting    CBC with platelets    Comprehensive metabolic panel    TSH with free T4 reflex       3. Patient needs to follow up in if no improvement,or sooner if worsening of symptoms or other symptoms develop.  FUTURE LABS:       - Schedule a fasting blood draw   Will follow up and/or notify patient of  results via My Chart to determine further need for followup  Schedule physical exam

## 2022-12-22 NOTE — PROGRESS NOTES
Shay Shen is a 37 year old, presenting for the following health issues:  Pharyngitis      Pharyngitis     History of Present Illness       Reason for visit:  Sore throat  Symptom onset:  3-7 days ago  Symptom intensity:  Moderate  Symptom progression:  Staying the same  Had these symptoms before:  No  What makes it worse:  Eating hard foods  What makes it better:  Advil    She eats 0-1 servings of fruits and vegetables daily.She consumes 1 sweetened beverage(s) daily.She exercises with enough effort to increase her heart rate 10 to 19 minutes per day.  She exercises with enough effort to increase her heart rate 3 or less days per week.   She is taking medications regularly.    Today's PHQ-9         PHQ-9 Total Score: 11    PHQ-9 Q9 Thoughts of better off dead/self-harm past 2 weeks :   Not at all    How difficult have these problems made it for you to do your work, take care of things at home, or get along with other people: Somewhat difficult       Acute Illness  Acute illness concerns: Sore throat  Onset/Duration: Dec. 19th Monday  Symptoms:  Fever: No  Chills/Sweats: YES- Sweats at night  Headache (location?): YES  Sinus Pressure: No  Conjunctivitis:  No  Ear Pain: YES: left  Rhinorrhea: YES  Congestion: No  Sore Throat: YES  Cough: YES - Not coughing anymore. Was wet cough  Wheeze: No  Decreased Appetite: YES  Nausea: YES  Vomiting: No  Diarrhea: No  Dysuria/Freq.: No  Dysuria or Hematuria: No  Fatigue/Achiness: YES  Sick/Strep Exposure: No  Therapies tried and outcome: Advil helps. Taking nyquil at night helps.    Depression and Anxiety Follow-Up    How are you doing with your depression since your last visit? No change    How are you doing with your anxiety since your last visit?  No change    Are you having other symptoms that might be associated with depression or anxiety? Yes:  some insomnia     Have you had a significant life event? OTHER: had a divorce about 2020     Do you have any concerns  with your use of alcohol or other drugs? No  Has been on it for several years     Feels like does well on it   Social History     Tobacco Use     Smoking status: Never     Smokeless tobacco: Never   Vaping Use     Vaping Use: Never used   Substance Use Topics     Alcohol use: Yes     Alcohol/week: 2.0 standard drinks     Drug use: No     PHQ 9/4/2020 12/22/2022   PHQ-9 Total Score 7 11   Q9: Thoughts of better off dead/self-harm past 2 weeks Not at all Not at all   Some encounter information is confidential and restricted. Go to Review Flowsheets activity to see all data.     No flowsheet data found.  Last PHQ-9 12/22/2022   1.  Little interest or pleasure in doing things 1   2.  Feeling down, depressed, or hopeless 1   3.  Trouble falling or staying asleep, or sleeping too much 2   4.  Feeling tired or having little energy 2   5.  Poor appetite or overeating 2   6.  Feeling bad about yourself 1   7.  Trouble concentrating 2   8.  Moving slowly or restless 0   Q9: Thoughts of better off dead/self-harm past 2 weeks 0   PHQ-9 Total Score 11   Difficulty at work, home, or with people -   Some encounter information is confidential and restricted. Go to Review Flowsheets activity to see all data.         Suicide Assessment Five-step Evaluation and Treatment (SAFE-T)    Lab work is in process  Labs reviewed in EPIC  BP Readings from Last 3 Encounters:   12/22/22 123/82    Wt Readings from Last 3 Encounters:   12/22/22 103.4 kg (228 lb)   08/02/19 106.7 kg (235 lb 3.2 oz)   11/29/18 99.3 kg (219 lb)                  Patient Active Problem List   Diagnosis     BMI 34.0-34.9,adult     Dense breast tissue on mammogram     Moderate major depression (H)     No past surgical history on file.    Social History     Tobacco Use     Smoking status: Never     Smokeless tobacco: Never   Substance Use Topics     Alcohol use: Yes     Alcohol/week: 2.0 standard drinks     Family History   Problem Relation Age of Onset     No Known  "Problems Mother      No Known Problems Father      No Known Problems Brother      Hypertension Paternal Grandfather      Diabetes Maternal Grandmother      Skin Cancer Maternal Grandmother      GERD Other      Breast Cancer No family hx of      Colon Cancer No family hx of      Prostate Cancer No family hx of          Current Outpatient Medications   Medication Sig Dispense Refill     ibuprofen (ADVIL,MOTRIN) 200 MG tablet [IBUPROFEN (ADVIL,MOTRIN) 200 MG TABLET] Take 200 mg by mouth every 6 (six) hours as needed for pain.       sertraline (ZOLOFT) 50 MG tablet Take 1 tablet (50 mg) by mouth daily 90 tablet 3     No Known Allergies        Review of Systems   HENT: Positive for sore throat.       CONSTITUTIONAL:POSITIVE  for anorexia and sweats and NEGATIVE  for fever   ENT/MOUTH: POSITIVE for sore throat and NEGATIVE for epistaxis and fever  RESP:POSITIVE for cough-productive and NEGATIVE for hemoptysis, SOB/dyspnea and wheezing  CV: NEGATIVE for chest pain/chest pressure  GI: POSITIVE for poor appetite and NEGATIVE for vomiting  MUSCULOSKELETAL: POSITIVE  for myalgia and NEGATIVE for joint swelling  and joint warmth   NEURO: NEGATIVE for weakness, dizziness or paresthesias  ENDOCRINE: NEGATIVE for temperature intolerance, skin/hair changes  HEME/ALLERGY/IMMUNE: NEGATIVE for bleeding problems      Objective    /82   Pulse 71   Temp 98.2  F (36.8  C) (Oral)   Resp 16   Ht 1.781 m (5' 10.1\")   Wt 103.4 kg (228 lb)   SpO2 97%   BMI 32.62 kg/m    Body mass index is 32.62 kg/m .   Wt Readings from Last 4 Encounters:   12/22/22 103.4 kg (228 lb)   08/02/19 106.7 kg (235 lb 3.2 oz)   11/29/18 99.3 kg (219 lb)   05/10/18 99.8 kg (220 lb 1.6 oz)       Physical Exam   GENERAL: Patient is well nourished, well developed,in no apparent distress, non-toxic, in no respiratory distress and acyanotic, alert, cooperative and well hydrated  mildly ill but alert and responsive  EYES:  Right conjunctiva is not injected and " without discharge.  Left conjunctiva is not injected and without discharge.  EARS: negative findings: external ears normal to inspection and palpation, canals clear, TM's clear, normal light reflex, no mastoid process tenderness,  NOSE: Nares normal. Septum midline. Mucosa normal. No drainage or sinus tenderness.,  Sinus not tender.  THROAT: 1+ tonsillar hypertrophy.  NECK: supple with no adenopathy,   CARDIAC:NORMAL - regular rate and rhythm without murmur.  RESP: normal respiratory rate and rhythm, lungs clear to auscultation  unlabored respirations, no intercostal retractions or accessory muscle use  ABD: Abdomen soft, non-tender.  SKIN: Skin color, texture, turgor normal. No rashes or lesions.  MS: extremities normal- no gross deformities noted, gait normal and normal muscle tone  Alert, oriented, thought content appropriate, affect: flat, when questioned about suicide, the patient expresses no suicidal ideation, no homicidal ideation,mentation appears normal., patient appears normal, good hygiene, oriented X 3  MENTAL STATUS EXAM:  Appearance/Behavior: No apparent distress, Casually groomed and Dressed appropriately for weather  Speech: Normal  Mood/Affect: depressed affect  Insight: Adequate          Results for orders placed or performed in visit on 12/22/22   Symptomatic COVID-19 Virus (Coronavirus) by PCR Nose     Status: Normal    Specimen: Nose; Swab   Result Value Ref Range    SARS CoV2 PCR Negative Negative    Narrative    Testing was performed using the Aptima SARS-CoV-2 Assay on the  [employer] Instrument System. Additional information about this  Emergency Use Authorization (EUA) assay can be found via the Lab  Guide. This test should be ordered for the detection of SARS-CoV-2 in  individuals who meet SARS-CoV-2 clinical and/or epidemiological  criteria. Test performance is unknown in asymptomatic patients. This  test is for in vitro diagnostic use under the FDA EUA for  laboratories certified under  CLIA to perform high complexity testing.  This test has not been FDA cleared or approved. A negative result  does not rule out the presence of PCR inhibitors in the specimen or  target RNA in concentration below the limit of detection for the  assay. The possibility of a false negative should be considered if  the patient's recent exposure or clinical presentation suggests  COVID-19. This test was validated by the [facility]. This laboratory is certified under  the Clinical Laboratory Improvement Amendments of 1988 (CLIA-88) as  qualified to perform high complexity laboratory testing.   Streptococcus A Rapid Screen w/Reflex to PCR - Clinic Collect     Status: Normal    Specimen: Throat; Swab   Result Value Ref Range    Group A Strep antigen Negative Negative   Influenza A & B Antigen - Clinic Collect     Status: Abnormal    Specimen: Nose; Swab   Result Value Ref Range    Influenza A antigen Negative Negative    Influenza B antigen Positive (A) Negative    Narrative    Test results must be correlated with clinical data. If necessary, results should be confirmed by a molecular assay or viral culture.   Group A Streptococcus PCR Throat Swab     Status: Normal    Specimen: Throat; Swab   Result Value Ref Range    Group A strep by PCR Not Detected Not Detected    Narrative    The Xpert Xpress Strep A test, performed on the [employer] Systems, is a rapid, qualitative in vitro diagnostic test for the detection of Streptococcus pyogenes (Group A ß-hemolytic Streptococcus, Strep A) in throat swab specimens from patients with signs and symptoms of pharyngitis. The Xpert Xpress Strep A test can be used as an aid in the diagnosis of Group A Streptococcal pharyngitis. The assay is not intended to monitor treatment for Group A Streptococcus infections. The Xpert Xpress Strep A test utilizes an automated real-time polymerase chain reaction (PCR) to detect Streptococcus  "pyogenes DNA.     Assessment & Plan     Sore throat  Will follow up and/or notify patient of  results via My Chart to determine further need for followup  - Symptomatic COVID-19 Virus (Coronavirus) by PCR Nose  - Streptococcus A Rapid Screen w/Reflex to PCR - Clinic Collect  - Influenza A & B Antigen - Clinic Collect  - Group A Streptococcus PCR Throat Swab  - Mononucleosis screen  Symptomatic therapy suggested: rest, increase fluids, apply heat to sinuses prn, OTC Chloraseptic spray, Cepacol lozenges, and call prn if symptoms persist or worsen.    Moderate major depression (H)  Reviewed concept of depression as function of biochemical imbalance of neurotransmitters/rationale for treatment.  Risks and benefits of medication(s) reviewed with patient.  Questions answered.  Counseling advised  Followup appointment in 1 month(s)  Patient instructed to call for significant side effects medications or problems  Patient advised immediate presentation to hospital for suicidal thought, etc.  Continue current medications as prescribed.   - sertraline (ZOLOFT) 50 MG tablet  Dispense: 90 tablet; Refill: 3    CARDIOVASCULAR SCREENING; LDL GOAL LESS THAN 160  - Lipid panel reflex to direct LDL Fasting    Screening for disorder of blood and blood-forming organs  - CBC with platelets    Screening for thyroid disorder  - TSH with free T4 reflex    Screening for diabetes mellitus (DM)  - Comprehensive metabolic panel      Prescription drug management   Time spent doing chart review, history and exam, documentation and further activities per the note       BMI:   Estimated body mass index is 32.62 kg/m  as calculated from the following:    Height as of this encounter: 1.781 m (5' 10.1\").    Weight as of this encounter: 103.4 kg (228 lb).       See Patient Instructions  Patient Instructions     PLAN:   1.   Symptomatic therapy suggested: rest, increase fluids, apply heat to sinuses prn, OTC Chloraseptic spray, Cepacol lozenges, and " call prn if symptoms persist or worsen.  Increase calcium to 1000mg and 1000iu Vit D    2.  Orders Placed This Encounter   Medications     sertraline (ZOLOFT) 50 MG tablet     Sig: Take 1 tablet (50 mg) by mouth daily     Dispense:  90 tablet     Refill:  3     Orders Placed This Encounter   Procedures     Symptomatic COVID-19 Virus (Coronavirus) by PCR Nose     Lipid panel reflex to direct LDL Fasting     CBC with platelets     Comprehensive metabolic panel     TSH with free T4 reflex       3. Patient needs to follow up in if no improvement,or sooner if worsening of symptoms or other symptoms develop.  FUTURE LABS:       - Schedule a fasting blood draw   Will follow up and/or notify patient of  results via My Chart to determine further need for followup  Schedule physical exam         Return in about 1 month (around 1/22/2023), or if symptoms worsen or fail to improve.    [facility]

## 2022-12-23 NOTE — RESULT ENCOUNTER NOTE
Ting Baez,    Attached are your test results.  Covid swab is negative    Please contact us if you have any questions.    Azucena Toribio, CNP

## 2023-08-12 ENCOUNTER — HEALTH MAINTENANCE LETTER (OUTPATIENT)
Age: 38
End: 2023-08-12

## 2024-03-28 SDOH — HEALTH STABILITY: PHYSICAL HEALTH: ON AVERAGE, HOW MANY MINUTES DO YOU ENGAGE IN EXERCISE AT THIS LEVEL?: 40 MIN

## 2024-03-28 SDOH — HEALTH STABILITY: PHYSICAL HEALTH: ON AVERAGE, HOW MANY DAYS PER WEEK DO YOU ENGAGE IN MODERATE TO STRENUOUS EXERCISE (LIKE A BRISK WALK)?: 3 DAYS

## 2024-03-28 ASSESSMENT — PATIENT HEALTH QUESTIONNAIRE - PHQ9
SUM OF ALL RESPONSES TO PHQ QUESTIONS 1-9: 3
SUM OF ALL RESPONSES TO PHQ QUESTIONS 1-9: 3
10. IF YOU CHECKED OFF ANY PROBLEMS, HOW DIFFICULT HAVE THESE PROBLEMS MADE IT FOR YOU TO DO YOUR WORK, TAKE CARE OF THINGS AT HOME, OR GET ALONG WITH OTHER PEOPLE: NOT DIFFICULT AT ALL

## 2024-03-28 ASSESSMENT — SOCIAL DETERMINANTS OF HEALTH (SDOH): HOW OFTEN DO YOU GET TOGETHER WITH FRIENDS OR RELATIVES?: ONCE A WEEK

## 2024-03-29 ENCOUNTER — OFFICE VISIT (OUTPATIENT)
Dept: FAMILY MEDICINE | Facility: CLINIC | Age: 39
End: 2024-03-29
Payer: COMMERCIAL

## 2024-03-29 VITALS
HEIGHT: 71 IN | SYSTOLIC BLOOD PRESSURE: 114 MMHG | WEIGHT: 229.5 LBS | DIASTOLIC BLOOD PRESSURE: 76 MMHG | RESPIRATION RATE: 16 BRPM | HEART RATE: 69 BPM | OXYGEN SATURATION: 99 % | TEMPERATURE: 98.4 F | BODY MASS INDEX: 32.13 KG/M2

## 2024-03-29 DIAGNOSIS — F32.1 MODERATE MAJOR DEPRESSION (H): Primary | ICD-10-CM

## 2024-03-29 DIAGNOSIS — Z00.00 ROUTINE GENERAL MEDICAL EXAMINATION AT A HEALTH CARE FACILITY: ICD-10-CM

## 2024-03-29 DIAGNOSIS — Z13.1 SCREENING FOR DIABETES MELLITUS (DM): ICD-10-CM

## 2024-03-29 DIAGNOSIS — Z13.220 SCREENING FOR HYPERLIPIDEMIA: ICD-10-CM

## 2024-03-29 DIAGNOSIS — Z30.41 ENCOUNTER FOR SURVEILLANCE OF CONTRACEPTIVE PILLS: ICD-10-CM

## 2024-03-29 PROCEDURE — 99395 PREV VISIT EST AGE 18-39: CPT | Performed by: INTERNAL MEDICINE

## 2024-03-29 PROCEDURE — 99213 OFFICE O/P EST LOW 20 MIN: CPT | Mod: 25 | Performed by: INTERNAL MEDICINE

## 2024-03-29 RX ORDER — LEVONORGESTREL AND ETHINYL ESTRADIOL 0.15-0.03
1 KIT ORAL DAILY
Qty: 84 TABLET | Refills: 3 | Status: SHIPPED | OUTPATIENT
Start: 2024-03-29

## 2024-03-29 NOTE — PROGRESS NOTES
"Preventive Care Visit  [facility] LLOY[facility] ABERNATHY  Gagan Mcdonnell MD, Internal Medicine  Mar 29, 2024      Assessment & Plan     Routine general medical examination at a health care facility  Discussed about vaccines  I could not access her [facility] record  I advised her to get a copy of the record by contacting the state through the [employer] aime  Discussed about diet and exercise and also about Wegovy and Mounjaro  Declined all vaccines  She is due for a Pap smear we discussed about this  Advised her that the Pap smear was due in 2022  Offered to do this today however  She is going to make an appointment with a gynecologist to get this done  Her great-grandmother had breast cancer  She had a mammogram in the past which was unremarkable  - Basic metabolic panel  (Ca, Cl, CO2, Creat, Gluc, K, Na, BUN); Future    Screening for diabetes mellitus (DM)    - Hemoglobin A1c; Future    Screening for hyperlipidemia    - Lipid panel reflex to direct LDL Fasting; Future    Moderate major depression (H)  PHQ-9 score is good today at 3  - sertraline (ZOLOFT) 50 MG tablet; Take 1 tablet (50 mg) by mouth daily  - OFFICE/OUTPT VISIT,EST,LEVL III    Encounter for surveillance of contraceptive pills  She needs a Pap smear, her blood pressure is doing good and she is stable on the current medication  - levonorgestrel-ethinyl estradiol (NORDETTE) 0.15-30 MG-MCG tablet; Take 1 tablet by mouth daily  - OFFICE/OUTPT VISIT,EST,LEVL III    Patient has been advised of split billing requirements and indicates understanding: No    30 minutes spent by me on the date of the encounter doing chart review, history and exam, documentation and further activities per the note      BMI  Estimated body mass index is 32.01 kg/m  as calculated from the following:    Height as of this encounter: 1.803 m (5' 11\").    Weight as of this encounter: 104.1 kg (229 lb 8 oz).       Counseling  Appropriate preventive services were discussed with this patient, " including applicable screening as appropriate for fall prevention, nutrition, physical activity, Tobacco-use cessation, weight loss and cognition.  Checklist reviewing preventive services available has been given to the patient.  Reviewed patient's diet, addressing concerns and/or questions.   She is at risk for lack of exercise and has been provided with information to increase physical activity for the benefit of her well-being.   The patient was instructed to see the dentist every 6 months.           Shay Shen is a 38 year old, presenting for the following:  Physical        3/29/2024     6:52 AM   Additional Questions   Roomed by Makayla        Health Care Directive  Patient does not have a Health Care Directive or Living Will: Discussed advance care planning with patient; however, patient declined at this time.    HPI  Here for physical and renewal of her medications            3/28/2024   General Health   How would you rate your overall physical health? (!) FAIR   Feel stress (tense, anxious, or unable to sleep) To some extent   (!) STRESS CONCERN      3/28/2024   Nutrition   Three or more servings of calcium each day? Yes   Diet: Regular (no restrictions)   How many servings of fruit and vegetables per day? (!) 0-1   How many sweetened beverages each day? 0-1         3/28/2024   Exercise   Days per week of moderate/strenous exercise 3 days   Average minutes spent exercising at this level 40 min         3/28/2024   Social Factors   Frequency of gathering with friends or relatives Once a week   Worry food won't last until get money to buy more No   Food not last or not have enough money for food? No   Do you have housing?  Yes   Are you worried about losing your housing? No   Lack of transportation? No   Unable to get utilities (heat,electricity)? No         3/28/2024   Dental   Dentist two times every year? (!) NO         3/28/2024   TB Screening   Were you born outside of the [place]? No       Today's PHQ-9  "Score:       3/28/2024     9:50 PM   PHQ-9 SCORE   PHQ-9 Total Score MyChart 3 (Minimal depression)   PHQ-9 Total Score 3         3/28/2024   Substance Use   Alcohol more than 3/day or more than 7/wk No   Do you use any other substances recreationally? No     Social History     Tobacco Use    Smoking status: Never    Smokeless tobacco: Never   Vaping Use    Vaping Use: Never used   Substance Use Topics    Alcohol use: Yes     Alcohol/week: 2.0 standard drinks of alcohol    Drug use: No             3/28/2024   Breast Cancer Screening   Family history of breast, colon, or ovarian cancer? No / Unknown      Mammogram Screening - Patient under 40 years of age: Routine Mammogram Screening not recommended.           3/28/2024   One time HIV Screening   Previous HIV test? I don't know         3/28/2024   STI Screening   New sexual partner(s) since last STI/HIV test? (!) YES      History of abnormal Pap smear: NO - age 30-65 PAP every 5 years with negative HPV co-testing recommended        12/7/2017     4:01 PM 7/7/2014    10:52 AM   PAP / HPV   PAP Negative for squamous intraepithelial lesion or malignancy  Electronically signed by Shelbi Han CT (ASCP) on 12/13/2017 at  8:51 AM    Negative for squamous intraepithelial lesion or malignancy  Electronically signed by Shelbi Han CT (ASCP) on 7/18/2014 at  2:20 PM              3/28/2024   Contraception/Family Planning   Questions about contraception or family planning No        Reviewed and updated as needed this visit by Provider                          Review of Systems  Constitutional, HEENT, cardiovascular, pulmonary, gi and gu systems are negative, except as otherwise noted.     Objective    Exam  LMP 03/25/2024    Estimated body mass index is 32.62 kg/m  as calculated from the following:    Height as of 12/22/22: 1.781 m (5' 10.1\").    Weight as of 12/22/22: 103.4 kg (228 lb).    Physical Exam  GENERAL: alert and no distress  EYES: Eyes grossly normal to " inspection, PERRL and conjunctivae and sclerae normal  HENT: ear canals and TM's normal, nose and mouth without ulcers or lesions  NECK: no adenopathy, no asymmetry, masses, or scars  RESP: lungs clear to auscultation - no rales, rhonchi or wheezes  CV: regular rate and rhythm, normal S1 S2, no S3 or S4, no murmur, click or rub, no peripheral edema  ABDOMEN: soft, nontender, no hepatosplenomegaly, no masses and bowel sounds normal  MS: no gross musculoskeletal defects noted, no edema  SKIN: no suspicious lesions or rashes  NEURO: Normal strength and tone, mentation intact and speech normal  PSYCH: mentation appears normal, affect normal/bright        Signed Electronically by: Gagan Mcdonnell MD    Answers submitted by the patient for this visit:  Patient Health Questionnaire (Submitted on 3/28/2024)  If you checked off any problems, how difficult have these problems made it for you to do your work, take care of things at home, or get along with other people?: Not difficult at all  PHQ9 TOTAL SCORE: 3

## 2025-02-27 ENCOUNTER — PATIENT OUTREACH (OUTPATIENT)
Dept: CARE COORDINATION | Facility: CLINIC | Age: 40
End: 2025-02-27
Payer: COMMERCIAL

## 2025-03-29 DIAGNOSIS — Z30.41 ENCOUNTER FOR SURVEILLANCE OF CONTRACEPTIVE PILLS: ICD-10-CM

## 2025-03-31 RX ORDER — LEVONORGESTREL AND ETHINYL ESTRADIOL 0.15-0.03
1 KIT ORAL DAILY
Qty: 84 TABLET | Refills: 0 | Status: SHIPPED | OUTPATIENT
Start: 2025-03-31

## 2025-05-18 ENCOUNTER — HEALTH MAINTENANCE LETTER (OUTPATIENT)
Age: 40
End: 2025-05-18

## 2025-06-03 DIAGNOSIS — F32.1 MODERATE MAJOR DEPRESSION (H): ICD-10-CM

## 2025-07-02 DIAGNOSIS — F32.1 MODERATE MAJOR DEPRESSION (H): ICD-10-CM
